# Patient Record
Sex: MALE | Race: WHITE | NOT HISPANIC OR LATINO | Employment: FULL TIME | URBAN - METROPOLITAN AREA
[De-identification: names, ages, dates, MRNs, and addresses within clinical notes are randomized per-mention and may not be internally consistent; named-entity substitution may affect disease eponyms.]

---

## 2017-05-26 ENCOUNTER — TELEPHONE (OUTPATIENT)
Dept: TRANSPLANT | Facility: CLINIC | Age: 27
End: 2017-05-26

## 2017-05-26 NOTE — TELEPHONE ENCOUNTER
"Living Liver Donor Evaluation Completed: May 25, 2017 16:37:48 CT Updated: May 25, 2017 16:38:21 CT  Donor Name: Fish Hampton MRN: Note: : 1990 Age: 26Gender: Male Donor Height: 6  4\" Weight (lb): 225 BMI: 27.4  Donor Race:  Ethnicity: Not / Donor Preferred Language: English  Required?: No Current Marital Status:   Demographics: Home Address: [address] [place] [address] City: [place] State: [place] Zip: [address] Country: [place]  Best Phone: [phone] Alt Phone: Donor Email: [email or web address] Best Phone Type: Mobile Alt Phone Type:   Preferred Contact Time(s): 11:00 AM-1:00 PM Preferred Contact Day(s): Mon, Tue, Wed, Thur, Fri  Donor Screen: PASSED Donor Referred by: Self Researched Donor self reported ABO: O  Recipient Information: Recipient Name (Last, First): Claudia Hampton Recipient :    1957  ... Donor Relationship: Parent Recipient Diagnosis: Recipient Height: Recipient Weight: Recipient ABO: Recipient MRN: Recipient Insurance Provider: Recipient Insurance Type:   MEDICAL HISTORY:  Herniated Disc (Lumbar)  Low Back Pain  MEDICATIONS:  Multivitamin  SURGICAL HISTORY:  None Reported  ALLERGIES:  NKDA  SOCIAL HISTORY:  EtOH: Occasional (1-2 drinks/week)  Illicit Drug Use: Denies  Tobacco: Denies  SELF-REPORTED FUNCTIONAL STATUS:  \"I am able to participate in strenuous sports such as swimming, singles tennis, football, basketball, or skiing\"  Exercise (>3X per week)  REVIEW OF ORGAN SYSTEMS: Airway or Lungs: No Blood Disorder: No Cancer: No Diabetes,Thyroid,Adrenal,Endocrine Disorder: No Digestive or Liver: No Heart or Circulatory System: No Immune Diseases: No Kidneys and Bladder: No Male Health: No Muscles,Bones,Joints: Yes Neuro: No Psych: No  FAMILY HISTORY: Confirmed:  Clots or Clotting Disease (Father)  Diabetes (Grandparent)  Inflammatory Bowel Disease (Father)  Liver Disease (Father)  Denied:  Cancer (denies)  Mental Illness (denies)  DONOR " INFORMATION:  Level of Education: College or baccalaureate degree complete Employment Status: Self Employed Medical Insurance Status: Has medical insurance Current Accommodation: Lives in rented accommodation Living Arrangement: With spouse Donor Motivation: Highly motivated donor  HIGH RISK BEHAVIOR:  Blood transfusion < 12 months. (NO)  Commercial sex < 12 months. (NO)  Illicit IV drug use < 5yrs. (NO)  Male:male sexual contact < 5yrs. (NO)  Other high risk sexual contact < 12 months. (NO)  EMERGENCY CONTACT INFORMATION:  Primary Secondary First Name: Judi Last Name: Berta Phone Number: [phone] Relationship: Mother  First Name: Jaclyn  Last Name: Berta Phone Number: [phone] Relationship: Spouse  REASON FOR DONATION:   I love my father and want him to live long enough to meet his grandchild  PHYSICIAN CONTACT INFORMATION:  PCP  Name: [facility]: [place] State: [place]  Phone: [phone]

## 2017-05-31 ENCOUNTER — TELEPHONE (OUTPATIENT)
Dept: TRANSPLANT | Facility: CLINIC | Age: 27
End: 2017-05-31

## 2017-05-31 NOTE — TELEPHONE ENCOUNTER
[facility]  Initial Telephone Psychosocial Screening  Living Organ Donation   Organ Type: related, liver  Presenting Information:  Mr. Fish Hampton presents to the [facility] since he is interested in becoming a potential liver donor to his biological father, Mr. Claudia Hampton.  I have been asked to complete an initial telephone psychosocial screening per our living liver donor evaluation protocol.  Mr. Hampton is a 26 year old , white, American ,male, who wants to donate a lobe of his liver to his father, Mr. Claudia Hampton.  PERSONAL BACKGROUND:  Current Living Situation: Mr. Hampton lives with his wife in a home that they rent in [place], [place].    Education/Employment/Financial Situation: Mr. Hampton completed a 4 year degree in [employer].  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  Mr. Hampton and his wife are covered by a [employer] plan through the [facility]'s Medical Assistance program.  Mr. Hampton's wife works outside the home as a .  Mr. Hampton and I discussed the loss of wages due to the fact that he would not be able to do his painting work for 8 weeks post surgery.  He states that his parents and his uncle have offered to make up any lost wages he might have as a result of living liver donation.  Mr. Hampton is also in the [employer].  He will be leaving on June 7, 2017 for a 3 week training in [place].  He will return on June 28, 2017.    Family History: Mr. Hampton was raised in [place] state.  He and his parents and 2 brothers moved here when his father was on sabbatical and never left.  His father is a .  His parents are still .  He has one older and one younger brother.  Mr. Hampton is  to his wife, Jaclyn Hampton, for 2 years.  They have a  baby on the way.  Jaclyn is due in December of 2017.    Mental Health: Denies any past or present treatment for mental health issues.  Denies any need to see a counselor or therapist.  Denies any past suicidal ideation, plans, or past attempts.  Denies any use of psychotropic medications.  Denies any past history of hospitalization for psychiatric illness.    Alcohol and Drug Use/Abuse/Dependency: Denies any past history of abuse or dependency on alcohol or illicit drugs. Denies any current use of street drugs, including marijuana.  Denies any past history of negative consequences of use of alcohol or drugs such as a DUI.   Mr. Hampton reports that he currently consumes 4 to 5 beers per week on average.  He states that he stopped drinking entirely 2 weeks ago in preparation to go through living liver donor testing.      Cigarette Use: Mr. Hampton does not smoke.    Legal: denies any legal issues.    Coping Strategies/Support System: His wife is supportive of his desire to be a donor.  He has a strong Anabaptism community and a lot of family support.  He is concerned about the risks of surgery given that he has a baby on the way who is due in December 2017.    DONOR SPECIFIC INFORMATION:  Relationship to Recipient: Mr. Hampton wants to donate a lobe of his liver to his father, Mr. Claudia Hampton, age 59.  They are biologically related.    Decision Process/Motivation to Donate: Mr. Hampton is motivated to help his father live longer and have a better quality of life.  He wants his father to be able to live to see his grandchild, who is due to be born in December of 2017.  He denies feeling any pressure, coercion, or being offered any payment to be a donor.    Impressions/Recommendations:   Mr. Fish Hampton  is highly motivated to donate a lobe of his liver to his father, Mr. Claudia Hampton, age 59.  His decision to donate is free of inducement, coercion, or other undue pressure.   His housing, finances and employment are stable.   No current/active mental health or chemical abuse issues were identified.  The need for a caregiver was reviewed and he is able to identify a plan to meet his post operative care needs.  Mr. Hampton appears capable of making an informed medical decision.  No psychosocial contraindications to living organ donation were identified and  I support Mr. Hampton s desire to continue the process of being evaluated as a potential living liver donor for his father, Mr. Claudia Hampton.     Contact Information:   FERN Dee, [facility]  Clinical  and Independent Donor Advocate  [facility] Trans[facility]  Pager:  [phone]  Direct:  [phone]      Time Spent: 45 minutes

## 2017-05-31 NOTE — TELEPHONE ENCOUNTER
I spoke to Fish to complete a living liver donor psychosocial telephone screening.  He asked if we could talk at 1 pm today.  I will call him then.    FERN Dee, [facility]  Clinical  and Independent Donor Advocate  [facility]  Pager:  [phone]  Direct:  [phone]

## 2017-06-01 ENCOUNTER — TELEPHONE (OUTPATIENT)
Dept: TRANSPLANT | Facility: CLINIC | Age: 27
End: 2017-06-01

## 2017-06-01 NOTE — TELEPHONE ENCOUNTER
Left message for Fish to return call. ? Do Phase 1 labs now? Leaving for [employer] trip 6/7 x 3 wks.

## 2017-06-01 NOTE — LETTER
PHYSICIAN ORDERS      DATE & TIME ISSUED: 2017 9:28 AM  PATIENT NAME: Fish Hampton   : 1990     [facility] MR#  [id number]     DIAGNOSIS:  Potential Living Donor  ICD-10 CODE: Z00.5     Please do the following lab tests:       1)Amylase       2)CMP       3)CBC with Platelets       4)INR/PTT       5)Lipid Panel    Any questions please call: Hannah at [phone]    Please fax these results to [phone]-ATTN:Hannah.    .

## 2017-06-01 NOTE — LETTER
REIMBURSEMENT INFORMATION FOR LIVING ORGAN DONORS    LIVING ORGAN DONOR: This form MUST accompany & remain attached to Orders &  given to Provider and/or Healthcare Facility Business Office    PROVIDER/FACILITY INSTRUCTIONS: By accepting to perform these services for living organ  donation, the provider/facility agrees to exclusively bill the [facility] instead of billing  the patient or any insurance provider and agrees to accept the reimbursement, as described below, as  payment in full for services rendered.    PROVIDER BILLING INSTRUCTIONS:  1. [facility] agrees to pay for all authorized testing ordered by our transplant  program that is related to living organ donation. The attached orders/tests are part of the donor  Evaluation.    2. Do not bill the donor or donor's insurance. Send an itemized invoice, claim or statement to:    [facility]  Transplant Finance/Donor Billing  [address] [place]  [place] [address]    3. Billing statements must include the patient first and last name, date of birth, the CPT procedure code  and date of service. Please bill service on the ORIGINAL UBO4 or 1500 with appropriate CPT/HCPCS  codes along with W-9 and send to the above address to insure timely reimbursement.    4. Claims should be submitted no later than six months from the date when services are rendered.  Claims denied for late submission should not be billed to the donor or their private insurance carrier.    5. [facility] will reimburse all charges at 100% of the Medicare Fee Schedule as  defined in the Code of Federal Regulations (CFR) 42, Chapter IV. This is to be considered payment  in full. [facility], the patient, and/or the patient's insurance are NOT to  be billed any balance, co-payment, or deductible, per Medicare regulations. **ATTN: Facility  providing services for attached/enclosed Living  Donor Orders; If facility does NOT AGREE to  the reimbursement rate stated above, PLEASE DENY SERVICES & refer Donor/patient back to  their [facility] Transplant Center.    6. Patients are NOT to make any payments at the time of service.    Please forward this information to your billing department so that a donor account can be set up with  these instructions.    Should you have any questions, please contact the Solid Organ Transplant office and ask for donor billing  at [phone] or [phone], Monday - Friday, 8:00 a.m. to 4:00 p.m.  Thank you for your assistance.

## 2017-06-01 NOTE — LETTER
PHYSICIAN ORDER   ABO BLOOD TYPE    DATE & TIME ISSUED: 20179:31 AM  PATIENT NAME: Fish Hampton   : 1990     [facility] MR# [if applicable]: [id number]     DIAGNOSIS:  Potential Living Donor  ICD-9 CODE: Z00.5    EXPIRES: (1 YEAR AFTER DATE ISSUED)    1. DRAW ABO BLOOD TYPE ONLY  (RH NOT REQUESTED AND NOT REIMBURSEABLE)    2. Mail or FAX results to:    THE TRANSPLANT CENTER  [facility]  [address] [place] [address]  FAX:  [phone]    3. Send billing to The Transplant Center at the above address.    .

## 2017-06-05 ENCOUNTER — OFFICE VISIT (OUTPATIENT)
Dept: URGENT CARE | Facility: URGENT CARE | Age: 27
End: 2017-06-05
Payer: MEDICAID

## 2017-06-05 ENCOUNTER — RADIANT APPOINTMENT (OUTPATIENT)
Dept: GENERAL RADIOLOGY | Facility: CLINIC | Age: 27
End: 2017-06-05
Attending: FAMILY MEDICINE
Payer: MEDICAID

## 2017-06-05 VITALS
WEIGHT: 230 LBS | BODY MASS INDEX: 28 KG/M2 | DIASTOLIC BLOOD PRESSURE: 80 MMHG | TEMPERATURE: 98.2 F | SYSTOLIC BLOOD PRESSURE: 128 MMHG | HEART RATE: 79 BPM | OXYGEN SATURATION: 99 %

## 2017-06-05 DIAGNOSIS — S99.922A FOOT INJURY, LEFT, INITIAL ENCOUNTER: Primary | ICD-10-CM

## 2017-06-05 PROCEDURE — 73630 X-RAY EXAM OF FOOT: CPT | Mod: LT

## 2017-06-05 PROCEDURE — 99213 OFFICE O/P EST LOW 20 MIN: CPT | Performed by: FAMILY MEDICINE

## 2017-06-05 RX ORDER — IBUPROFEN 200 MG
200 TABLET ORAL EVERY 4 HOURS PRN
COMMUNITY

## 2017-06-05 NOTE — MR AVS SNAPSHOT
"              After Visit Summary   2017    Fish Hampton    MRN: [id number]           Patient Information     Date Of Birth          1990        Visit Information        Provider Department      2017 9:20 AM Holden Chamorro, DO [facility]        Today's Diagnoses     Foot injury, left, initial encounter    -  1       Follow-ups after your visit        Who to contact     If you have questions or need follow up information about today's clinic visit or your schedule please contact [facility] directly at [phone].  Normal or non-critical lab and imaging results will be communicated to you by EarlyTrackshart, letter or phone within 4 business days after the clinic has received the results. If you do not hear from us within 7 days, please contact the clinic through EarlyTrackshart or phone. If you have a critical or abnormal lab result, we will notify you by phone as soon as possible.  Submit refill requests through Socratic or call your pharmacy and they will forward the refill request to us. Please allow 3 business days for your refill to be completed.          Additional Information About Your Visit        MyChart Information     Socratic lets you send messages to your doctor, view your test results, renew your prescriptions, schedule appointments and more. To sign up, go to [email or web address] . Click on \"Log in\" on the left side of the screen, which will take you to the Welcome page. Then click on \"Sign up Now\" on the right side of the page.     You will be asked to enter the access code listed below, as well as some personal information. Please follow the directions to create your username and password.     Your access code is: [email or web address]  Expires: 9/3/2017 10:21 AM     Your access code will  in 90 days. If you need help or a new code, please call your [facility] clinic or [phone].        Care EveryWhere ID     This is your Care EveryWhere " ID. This could be used by other organizations to access your [facility] medical records  [id number]        Your Vitals Were     Pulse Temperature Pulse Oximetry BMI (Body Mass Index)          79 98.2  F (36.8  C) 99% 28 kg/m2         Blood Pressure from Last 3 Encounters:   06/05/17 128/80   07/08/16 112/73   07/01/16 125/76    Weight from Last 3 Encounters:   06/05/17 230 lb (104.3 kg)   07/01/16 220 lb 11.2 oz (100.1 kg)   05/24/16 219 lb (99.3 kg)              We Performed the Following     XR Foot Left G/E 3 Views        Primary Care Provider Office Phone # Fax #    Ramirez Pierce [phone]       [place] [id number] [place], [place] [id number]  [facility] [address]        Thank you!     Thank you for choosing [facility]  for your care. Our goal is always to provide you with excellent care. Hearing back from our patients is one way we can continue to improve our services. Please take a few minutes to complete the written survey that you may receive in the mail after your visit with us. Thank you!             Your Updated Medication List - Protect others around you: Learn how to safely use, store and throw away your medicines at www.disposemymeds.org.          This list is accurate as of: 6/5/17 10:21 AM.  Always use your most recent med list.                   Brand Name Dispense Instructions for use    ibuprofen 200 MG tablet    ADVIL/MOTRIN     Take 200 mg by mouth every 4 hours as needed for mild pain

## 2017-06-05 NOTE — NURSING NOTE
"Chief Complaint   Patient presents with     Trauma     Pt injured his left foot and ankle on 5/31/17 while training with the .     Urgent Care       Initial /80  Pulse 79  Temp 98.2  F (36.8  C)  Wt 230 lb (104.3 kg)  SpO2 99%  BMI 28 kg/m2 Estimated body mass index is 28 kg/(m^2) as calculated from the following:    Height as of 7/1/16: 6' 4\" (1.93 m).    Weight as of this encounter: 230 lb (104.3 kg).  Medication Reconciliation: complete    "

## 2018-04-04 ENCOUNTER — TELEPHONE (OUTPATIENT)
Dept: TRANSPLANT | Facility: CLINIC | Age: 28
End: 2018-04-04

## 2018-04-04 NOTE — TELEPHONE ENCOUNTER
Fish did Candye questionaire again wanting to test for donation. Per recip coordinator, recip currently in hosp and we need to wait with further testing. No LM re: this and will watch and contact him when ready to cont process. Did testing and had SW screening last year.

## 2018-05-09 DIAGNOSIS — Z00.5 TRANSPLANT DONOR EVALUATION: Primary | ICD-10-CM

## 2018-05-22 DIAGNOSIS — Z00.5 TRANSPLANT DONOR EVALUATION: ICD-10-CM

## 2018-05-22 LAB
ABO + RH BLD: NORMAL
ABO + RH BLD: NORMAL
ALBUMIN SERPL-MCNC: 4.6 G/DL (ref 3.4–5)
ALP SERPL-CCNC: 60 U/L (ref 40–150)
ALT SERPL W P-5'-P-CCNC: 48 U/L (ref 0–70)
AMYLASE SERPL-CCNC: 45 U/L (ref 30–110)
ANION GAP SERPL CALCULATED.3IONS-SCNC: 8 MMOL/L (ref 3–14)
APTT PPP: 26 SEC (ref 22–37)
AST SERPL W P-5'-P-CCNC: 29 U/L (ref 0–45)
BILIRUB SERPL-MCNC: 0.9 MG/DL (ref 0.2–1.3)
BUN SERPL-MCNC: 18 MG/DL (ref 7–30)
CALCIUM SERPL-MCNC: 9 MG/DL (ref 8.5–10.1)
CHLORIDE SERPL-SCNC: 106 MMOL/L (ref 94–109)
CHOLEST SERPL-MCNC: 98 MG/DL
CO2 SERPL-SCNC: 26 MMOL/L (ref 20–32)
CREAT SERPL-MCNC: 0.84 MG/DL (ref 0.66–1.25)
ERYTHROCYTE [DISTWIDTH] IN BLOOD BY AUTOMATED COUNT: 12.3 % (ref 10–15)
GFR SERPL CREATININE-BSD FRML MDRD: >90 ML/MIN/1.7M2
GLUCOSE SERPL-MCNC: 89 MG/DL (ref 70–99)
HCT VFR BLD AUTO: 44.1 % (ref 40–53)
HDLC SERPL-MCNC: 36 MG/DL
HGB BLD-MCNC: 15.9 G/DL (ref 13.3–17.7)
INR PPP: 0.95 (ref 0.86–1.14)
LDLC SERPL CALC-MCNC: 55 MG/DL
MCH RBC QN AUTO: 28.8 PG (ref 26.5–33)
MCHC RBC AUTO-ENTMCNC: 36.1 G/DL (ref 31.5–36.5)
MCV RBC AUTO: 80 FL (ref 78–100)
NONHDLC SERPL-MCNC: 62 MG/DL
PLATELET # BLD AUTO: 160 10E9/L (ref 150–450)
POTASSIUM SERPL-SCNC: 4.3 MMOL/L (ref 3.4–5.3)
PROT SERPL-MCNC: 8 G/DL (ref 6.8–8.8)
RBC # BLD AUTO: 5.52 10E12/L (ref 4.4–5.9)
SODIUM SERPL-SCNC: 140 MMOL/L (ref 133–144)
SPECIMEN EXP DATE BLD: NORMAL
TRIGL SERPL-MCNC: 35 MG/DL
WBC # BLD AUTO: 4.3 10E9/L (ref 4–11)

## 2018-05-22 PROCEDURE — 80053 COMPREHEN METABOLIC PANEL: CPT | Performed by: TRANSPLANT SURGERY

## 2018-05-22 PROCEDURE — 82150 ASSAY OF AMYLASE: CPT | Performed by: TRANSPLANT SURGERY

## 2018-05-22 PROCEDURE — 80061 LIPID PANEL: CPT | Performed by: TRANSPLANT SURGERY

## 2018-05-22 PROCEDURE — 85730 THROMBOPLASTIN TIME PARTIAL: CPT | Performed by: TRANSPLANT SURGERY

## 2018-05-22 PROCEDURE — 85027 COMPLETE CBC AUTOMATED: CPT | Performed by: TRANSPLANT SURGERY

## 2018-05-22 PROCEDURE — 86900 BLOOD TYPING SEROLOGIC ABO: CPT | Performed by: TRANSPLANT SURGERY

## 2018-05-22 PROCEDURE — 85610 PROTHROMBIN TIME: CPT | Performed by: TRANSPLANT SURGERY

## 2018-05-22 PROCEDURE — 36415 COLL VENOUS BLD VENIPUNCTURE: CPT | Performed by: TRANSPLANT SURGERY

## 2018-05-24 ENCOUNTER — TELEPHONE (OUTPATIENT)
Dept: TRANSPLANT | Facility: CLINIC | Age: 28
End: 2018-05-24

## 2018-05-24 DIAGNOSIS — Z00.5 TRANSPLANT DONOR EVALUATION: ICD-10-CM

## 2018-05-24 NOTE — TELEPHONE ENCOUNTER
All Phase 1 tests are OK. Was screened by ANDREAS a few months ago and was OK. Wants to come for eval.

## 2018-06-14 ENCOUNTER — ALLIED HEALTH/NURSE VISIT (OUTPATIENT)
Dept: TRANSPLANT | Facility: CLINIC | Age: 28
End: 2018-06-14
Attending: TRANSPLANT SURGERY

## 2018-06-14 ENCOUNTER — OFFICE VISIT (OUTPATIENT)
Dept: TRANSPLANT | Facility: CLINIC | Age: 28
End: 2018-06-14
Attending: TRANSPLANT SURGERY
Payer: COMMERCIAL

## 2018-06-14 ENCOUNTER — OFFICE VISIT (OUTPATIENT)
Dept: TRANSPLANT | Facility: CLINIC | Age: 28
End: 2018-06-14
Attending: INTERNAL MEDICINE

## 2018-06-14 ENCOUNTER — OFFICE VISIT (OUTPATIENT)
Dept: TRANSPLANT | Facility: CLINIC | Age: 28
End: 2018-06-14
Attending: NURSE PRACTITIONER
Payer: COMMERCIAL

## 2018-06-14 VITALS
HEART RATE: 68 BPM | OXYGEN SATURATION: 99 % | WEIGHT: 240 LBS | BODY MASS INDEX: 29.22 KG/M2 | TEMPERATURE: 98.2 F | DIASTOLIC BLOOD PRESSURE: 82 MMHG | SYSTOLIC BLOOD PRESSURE: 133 MMHG | RESPIRATION RATE: 18 BRPM | HEIGHT: 76 IN

## 2018-06-14 VITALS
WEIGHT: 240 LBS | RESPIRATION RATE: 18 BRPM | HEART RATE: 52 BPM | DIASTOLIC BLOOD PRESSURE: 80 MMHG | TEMPERATURE: 98.2 F | HEIGHT: 76 IN | SYSTOLIC BLOOD PRESSURE: 125 MMHG | BODY MASS INDEX: 29.22 KG/M2 | OXYGEN SATURATION: 99 %

## 2018-06-14 VITALS
RESPIRATION RATE: 18 BRPM | HEART RATE: 52 BPM | TEMPERATURE: 98.2 F | DIASTOLIC BLOOD PRESSURE: 80 MMHG | BODY MASS INDEX: 29.22 KG/M2 | HEIGHT: 76 IN | WEIGHT: 240 LBS | OXYGEN SATURATION: 99 % | SYSTOLIC BLOOD PRESSURE: 125 MMHG

## 2018-06-14 DIAGNOSIS — Z00.5 TRANSPLANT DONOR EVALUATION: Primary | ICD-10-CM

## 2018-06-14 DIAGNOSIS — Z52.6 LIVER DONOR: Primary | ICD-10-CM

## 2018-06-14 DIAGNOSIS — Z00.5 TRANSPLANT DONOR EVALUATION: ICD-10-CM

## 2018-06-14 DIAGNOSIS — Z52.6 LIVER DONOR: ICD-10-CM

## 2018-06-14 LAB
ABO + RH BLD: NORMAL
ABO + RH BLD: NORMAL
ALBUMIN SERPL-MCNC: 4.4 G/DL (ref 3.4–5)
ALBUMIN UR-MCNC: NEGATIVE MG/DL
ALP SERPL-CCNC: 62 U/L (ref 40–150)
ALT SERPL W P-5'-P-CCNC: 38 U/L (ref 0–70)
AMORPH CRY #/AREA URNS HPF: ABNORMAL /HPF
ANION GAP SERPL CALCULATED.3IONS-SCNC: 7 MMOL/L (ref 3–14)
APPEARANCE UR: CLEAR
APTT PPP: 30 SEC (ref 22–37)
AST SERPL W P-5'-P-CCNC: 19 U/L (ref 0–45)
BILIRUB SERPL-MCNC: 0.9 MG/DL (ref 0.2–1.3)
BILIRUB UR QL STRIP: NEGATIVE
BLD GP AB SCN SERPL QL: NORMAL
BLOOD BANK CMNT PATIENT-IMP: NORMAL
BUN SERPL-MCNC: 16 MG/DL (ref 7–30)
CALCIUM SERPL-MCNC: 8.7 MG/DL (ref 8.5–10.1)
CERULOPLASMIN SERPL-MCNC: 27 MG/DL (ref 23–53)
CHLORIDE SERPL-SCNC: 107 MMOL/L (ref 94–109)
CHOLEST SERPL-MCNC: 87 MG/DL
CMV IGG SERPL QL IA: 0.5 AI (ref 0–0.8)
CO2 SERPL-SCNC: 27 MMOL/L (ref 20–32)
COLOR UR AUTO: YELLOW
CREAT SERPL-MCNC: 0.82 MG/DL (ref 0.66–1.25)
EBV VCA IGG SER QL IA: 1.9 AI (ref 0–0.8)
EBV VCA IGM SER QL IA: 0.2 AI (ref 0–0.8)
ERYTHROCYTE [DISTWIDTH] IN BLOOD BY AUTOMATED COUNT: 11.9 % (ref 10–15)
FERRITIN SERPL-MCNC: 152 NG/ML (ref 26–388)
GFR SERPL CREATININE-BSD FRML MDRD: >90 ML/MIN/1.7M2
GGT SERPL-CCNC: 12 U/L (ref 0–75)
GLUCOSE SERPL-MCNC: 95 MG/DL (ref 70–99)
GLUCOSE UR STRIP-MCNC: NEGATIVE MG/DL
HBV CORE AB SERPL QL IA: NONREACTIVE
HBV SURFACE AB SERPL IA-ACNC: 29.75 M[IU]/ML
HBV SURFACE AG SERPL QL IA: NONREACTIVE
HCT VFR BLD AUTO: 45.7 % (ref 40–53)
HCV AB SERPL QL IA: NONREACTIVE
HDLC SERPL-MCNC: 34 MG/DL
HGB BLD-MCNC: 16.4 G/DL (ref 13.3–17.7)
HGB UR QL STRIP: NEGATIVE
HIV 1+2 AB+HIV1 P24 AG SERPL QL IA: NONREACTIVE
INR PPP: 1 (ref 0.86–1.14)
INTERPRETATION ECG - MUSE: NORMAL
IRON SATN MFR SERPL: 26 % (ref 15–46)
IRON SERPL-MCNC: 78 UG/DL (ref 35–180)
KETONES UR STRIP-MCNC: NEGATIVE MG/DL
LDLC SERPL CALC-MCNC: 46 MG/DL
LEUKOCYTE ESTERASE UR QL STRIP: NEGATIVE
LIPASE SERPL-CCNC: 68 U/L (ref 73–393)
MCH RBC QN AUTO: 28.5 PG (ref 26.5–33)
MCHC RBC AUTO-ENTMCNC: 35.9 G/DL (ref 31.5–36.5)
MCV RBC AUTO: 79 FL (ref 78–100)
MUCOUS THREADS #/AREA URNS LPF: PRESENT /LPF
NITRATE UR QL: NEGATIVE
NONHDLC SERPL-MCNC: 53 MG/DL
PH UR STRIP: 6 PH (ref 5–7)
PHOSPHATE SERPL-MCNC: 3.6 MG/DL (ref 2.5–4.5)
PLATELET # BLD AUTO: 171 10E9/L (ref 150–450)
POTASSIUM SERPL-SCNC: 4 MMOL/L (ref 3.4–5.3)
PROT SERPL-MCNC: 7.8 G/DL (ref 6.8–8.8)
RBC # BLD AUTO: 5.76 10E12/L (ref 4.4–5.9)
RBC #/AREA URNS AUTO: 1 /HPF (ref 0–2)
SODIUM SERPL-SCNC: 140 MMOL/L (ref 133–144)
SOURCE: ABNORMAL
SP GR UR STRIP: 1.02 (ref 1–1.03)
SPECIMEN EXP DATE BLD: NORMAL
T PALLIDUM AB SER QL: NONREACTIVE
TIBC SERPL-MCNC: 299 UG/DL (ref 240–430)
TRIGL SERPL-MCNC: 35 MG/DL
UROBILINOGEN UR STRIP-MCNC: 0 MG/DL (ref 0–2)
WBC # BLD AUTO: 4 10E9/L (ref 4–11)
WBC #/AREA URNS AUTO: <1 /HPF (ref 0–5)

## 2018-06-14 PROCEDURE — 36415 COLL VENOUS BLD VENIPUNCTURE: CPT | Performed by: TRANSPLANT SURGERY

## 2018-06-14 PROCEDURE — 86665 EPSTEIN-BARR CAPSID VCA: CPT | Performed by: TRANSPLANT SURGERY

## 2018-06-14 PROCEDURE — 87340 HEPATITIS B SURFACE AG IA: CPT | Performed by: TRANSPLANT SURGERY

## 2018-06-14 PROCEDURE — 86480 TB TEST CELL IMMUN MEASURE: CPT | Performed by: TRANSPLANT SURGERY

## 2018-06-14 PROCEDURE — 84100 ASSAY OF PHOSPHORUS: CPT | Performed by: TRANSPLANT SURGERY

## 2018-06-14 PROCEDURE — 80061 LIPID PANEL: CPT | Performed by: TRANSPLANT SURGERY

## 2018-06-14 PROCEDURE — 86850 RBC ANTIBODY SCREEN: CPT | Performed by: TRANSPLANT SURGERY

## 2018-06-14 PROCEDURE — 85027 COMPLETE CBC AUTOMATED: CPT | Performed by: TRANSPLANT SURGERY

## 2018-06-14 PROCEDURE — 81240 F2 GENE: CPT | Performed by: TRANSPLANT SURGERY

## 2018-06-14 PROCEDURE — G0463 HOSPITAL OUTPT CLINIC VISIT: HCPCS | Mod: ZF

## 2018-06-14 PROCEDURE — 40000866 ZZHCL STATISTIC HIV 1/2 ANTIGEN/ANTIBODY PRETRANSPLANT ONLY: Performed by: TRANSPLANT SURGERY

## 2018-06-14 PROCEDURE — 83550 IRON BINDING TEST: CPT | Performed by: TRANSPLANT SURGERY

## 2018-06-14 PROCEDURE — 86038 ANTINUCLEAR ANTIBODIES: CPT | Performed by: TRANSPLANT SURGERY

## 2018-06-14 PROCEDURE — 85610 PROTHROMBIN TIME: CPT | Performed by: TRANSPLANT SURGERY

## 2018-06-14 PROCEDURE — 82103 ALPHA-1-ANTITRYPSIN TOTAL: CPT | Performed by: TRANSPLANT SURGERY

## 2018-06-14 PROCEDURE — 86704 HEP B CORE ANTIBODY TOTAL: CPT | Performed by: TRANSPLANT SURGERY

## 2018-06-14 PROCEDURE — 83540 ASSAY OF IRON: CPT | Performed by: TRANSPLANT SURGERY

## 2018-06-14 PROCEDURE — 85730 THROMBOPLASTIN TIME PARTIAL: CPT | Performed by: TRANSPLANT SURGERY

## 2018-06-14 PROCEDURE — 86900 BLOOD TYPING SEROLOGIC ABO: CPT | Performed by: TRANSPLANT SURGERY

## 2018-06-14 PROCEDURE — 81001 URINALYSIS AUTO W/SCOPE: CPT | Performed by: TRANSPLANT SURGERY

## 2018-06-14 PROCEDURE — 83690 ASSAY OF LIPASE: CPT | Performed by: TRANSPLANT SURGERY

## 2018-06-14 PROCEDURE — 86901 BLOOD TYPING SEROLOGIC RH(D): CPT | Performed by: TRANSPLANT SURGERY

## 2018-06-14 PROCEDURE — 81332 SERPINA1 GENE: CPT | Performed by: TRANSPLANT SURGERY

## 2018-06-14 PROCEDURE — 86780 TREPONEMA PALLIDUM: CPT | Performed by: TRANSPLANT SURGERY

## 2018-06-14 PROCEDURE — 83516 IMMUNOASSAY NONANTIBODY: CPT | Performed by: TRANSPLANT SURGERY

## 2018-06-14 PROCEDURE — 82728 ASSAY OF FERRITIN: CPT | Performed by: TRANSPLANT SURGERY

## 2018-06-14 PROCEDURE — 82390 ASSAY OF CERULOPLASMIN: CPT | Performed by: TRANSPLANT SURGERY

## 2018-06-14 PROCEDURE — 81241 F5 GENE: CPT | Performed by: TRANSPLANT SURGERY

## 2018-06-14 PROCEDURE — 86803 HEPATITIS C AB TEST: CPT | Performed by: TRANSPLANT SURGERY

## 2018-06-14 PROCEDURE — 86644 CMV ANTIBODY: CPT | Performed by: TRANSPLANT SURGERY

## 2018-06-14 PROCEDURE — 82977 ASSAY OF GGT: CPT | Performed by: TRANSPLANT SURGERY

## 2018-06-14 PROCEDURE — 80053 COMPREHEN METABOLIC PANEL: CPT | Performed by: TRANSPLANT SURGERY

## 2018-06-14 PROCEDURE — 86706 HEP B SURFACE ANTIBODY: CPT | Performed by: TRANSPLANT SURGERY

## 2018-06-14 ASSESSMENT — PAIN SCALES - GENERAL
PAINLEVEL: NO PAIN (0)

## 2018-06-14 NOTE — LETTER
6/14/2018       RE: Fish Hampton  [address]  [place] [address]     Dear Colleague,    Thank you for referring your patient, Fish Hampton, to the [facility] SOLID ORGAN TRANSPLANT at [facility]. Please see a copy of my visit note below.    HPI      ROS      Physical Exam    See sc note    Again, thank you for allowing me to participate in the care of your patient.      Sincerely,    Jesu Lagunas MD

## 2018-06-14 NOTE — MR AVS SNAPSHOT
After Visit Summary   6/14/2018    Fish Hampton    MRN: [id number]           Patient Information     Date Of Birth          1990        Visit Information        Provider Department      6/14/2018 8:00 AM Carmen Fuentes [facility] Solid Organ Transplant        Today's Diagnoses     Transplant donor evaluation    -  1       Follow-ups after your visit        Your next 10 appointments already scheduled     Tyson 15, 2018 12:00 PM CDT   CTA ANGIOGRAM ABDOMEN PELVIS with UUCT1   [facility], [place], [place] ([facility], [facility])    [address] [address] [phone]           Please bring any scans or X-rays taken at other hospitals, if similar tests were done. Also bring a list of your medicines, including vitamins, minerals and over-the-counter drugs. It is safest to leave personal items at home.  Be sure to tell your doctor:   If you have any allergies.   If there s any chance you are pregnant.   If you are breastfeeding.    If you have diabetes as your medication may need to be adjusted for this exam.  You will have contrast for this exam. To prepare:   Do not eat or drink for 2 hours before your exam. If you need to take medicine, you may take it with small sips of water. (We may ask you to take liquid medicine as well.)   The day before your exam, drink extra fluids at least six 8-ounce glasses (unless your doctor tells you to restrict your fluids).  Patients over 70 or patients with diabetes or kidney problems:   If you haven t had a blood test (creatinine test) within the last 30 days, the Cardiologist/Radiologist may require you to get this test prior to your exam.  Please wear loose clothing, such as a sweat suit or jogging clothes. Avoid snaps, zippers and other metal. We may ask you to undress and put on a hospital gown.  If you have any questions, please call the Imaging Department where you will have your exam.             Tyson 15, 2018  1:00 PM CDT   MR ABDOMEN MRCP W/O & W CONTRAST with UUMR1   [facility], [place], [facility] ([facility] [place])    [address] [address] [phone]           Take your medicines as usual, unless your doctor tells you not to. Bring a list of your current medicines to your exam (including vitamins, minerals and over-the-counter drugs). Also bring the results of similar scans you may have had.    You may or may not receive IV contrast for this exam pending the discretion of the Radiologist.   Do not eat or drink for 6 hours prior to exam.  The MRI machine uses a strong magnet. Please wear clothes without metal (snaps, zippers). A sweatsuit works well, or we may give you a hospital gown.  Please remove any body piercings and hair extensions before you arrive. You will also remove watches, jewelry, hairpins, wallets, dentures, partial dental plates and hearing aids. You may wear contact lenses, and you may be able to wear your rings. We have a safe place to keep your personal items, but it is safer to leave them at home.  **IMPORTANT** THE INSTRUCTIONS BELOW ARE ONLY FOR THOSE PATIENTS WHO HAVE BEEN PRESCRIBED SEDATION OR GENERAL ANESTHESIA DURING THEIR MRI PROCEDURE:  IF YOUR DOCTOR PRESCRIBED ORAL SEDATION (take medicine to help you relax during your exam):   You must get the medicine from your doctor (oral medication) before you arrive. Bring the medicine to the exam. Do not take it at home. You ll be told when to take it upon arriving for your exam.   Arrive one hour early. Bring someone who can take you home after the test. Your medicine will make you sleepy. After the exam, you may not drive, take a bus or take a taxi by yourself.  IF YOUR DOCTOR PRESCRIBED IV SEDATION:   Arrive one hour early. Bring someone who can take you home after the test. Your medicine will make you sleepy. After the exam, you may not drive, take a bus or take a  "taxi by yourself.   No eating 6 hours before your exam. You may have clear liquids up until 4 hours before your exam. (Clear liquids include water, clear tea, black coffee and fruit juice without pulp.)  IF YOUR DOCTOR PRESCRIBED ANESTHESIA (be asleep for your exam):   Arrive 1 1/2 hours early. Bring someone who can take you home after the test. You may not drive, take a bus or take a taxi by yourself.   No eating 8 hours before your exam. You may have clear liquids up until 4 hours before your exam. (Clear liquids include water, clear tea, black coffee and fruit juice without pulp.)   You will spend four to five hours in the recovery room.  If you have any questions, please contact your Imaging Department exam site.              Future tests that were ordered for you today     Open Future Orders        Priority Expected Expires Ordered    Phosphorus Routine 6/14/2018 7/14/2018 6/14/2018            Who to contact     If you have questions or need follow up information about today's clinic visit or your schedule please contact [facility] directly at [phone].  Normal or non-critical lab and imaging results will be communicated to you by Screenherohart, letter or phone within 4 business days after the clinic has received the results. If you do not hear from us within 7 days, please contact the clinic through For Your Imaginationt or phone. If you have a critical or abnormal lab result, we will notify you by phone as soon as possible.  Submit refill requests through [facility] At [facility] or call your pharmacy and they will forward the refill request to us. Please allow 3 business days for your refill to be completed.          Additional Information About Your Visit        All At Home Information     [facility] lets you send messages to your doctor, view your test results, renew your prescriptions, schedule appointments and more. To sign up, go to [email or web address] . Click on \"Log in\" on the left side of the screen, which will " "take you to the Welcome page. Then click on \"Sign up Now\" on the right side of the page.     You will be asked to enter the access code listed below, as well as some personal information. Please follow the directions to create your username and password.     Your access code is: [email or web address]  Expires: 2018  6:30 AM     Your access code will  in 90 days. If you need help or a new code, please call your [place] clinic or [phone].        Care EveryWhere ID     This is your Care EveryWhere ID. This could be used by other organizations to access your [place] medical records  [id number]         Blood Pressure from Last 3 Encounters:   18 125/80   18 133/82   18 125/80    Weight from Last 3 Encounters:   18 108.9 kg (240 lb)   18 108.9 kg (240 lb)   18 108.9 kg (240 lb)              Today, you had the following     No orders found for display       Primary Care Provider Office Phone # Fax #    Ramirez Ryan Pierce [phone]       [address] [place] [address]        Equal Access to Services     SADE BHAKTA : Hadii kalyan nielson hadasho Soalexiali, waaxda luqadaha, qaybta kaalmada adeegyada, lily landa. So [facility] [phone].    ATENCIÓN: Si habla español, tiene a catalan disposición servicios gratuitos de asistencia lingüística. Llame al [phone].    We comply with applicable federal civil rights laws and [place] laws. We do not discriminate on the basis of race, color, national origin, age, disability, sex, sexual orientation, or gender identity.            Thank you!     Thank you for choosing [facility]  for your care. Our goal is always to provide you with excellent care. Hearing back from our patients is one way we can continue to improve our services. Please take a few minutes to complete the written survey that you may receive in the mail after your visit with us. Thank you!           "   Your Updated Medication List - Protect others around you: Learn how to safely use, store and throw away your medicines at www.disposemymeds.org.          This list is accurate as of 6/14/18 11:59 PM.  Always use your most recent med list.                   Brand Name Dispense Instructions for use Diagnosis    ibuprofen 200 MG tablet    ADVIL/MOTRIN     Take 200 mg by mouth every 4 hours as needed for mild pain

## 2018-06-14 NOTE — PROGRESS NOTES
SUBJECTIVE:  Fish Hampton is a 27 year old male presents for living liver donor evaluation.   Patient reports that 2 years ago he was evaluated for low back pain. He was seen by Sports Medicine and had MRI of the low back. He reports that he occasionally has exacerbations of the low back pain, most recent one was over the weekend. Patient also reports that he was diagnosed with a stress fracture which likely occurred in April. He also started a new job that required him to be on his feet. He will need to wear a boot, will have follow up in 6 weeks. He states that he has been taking ibuprofen as needed for back pain.     Past Medical History:   Diagnosis Date     Reduced vision      Scoliosis      There is no personal history of significant medical conditions which include but are not limited to hypertension, diabetes, renal diseases, lung disease, heart disease, gastrointestinal disease, autoimmune disease, neurologic disease, genitourinary disease, hematologic disorders, bleeding or clotting disorders, history of cancer, history of infections or obesity. No history of sychiatric illness, depression, suicide attempts .    Liver Specific History  No personal history of abnormal liver function tests, diabetes, fatty liver disease, jaundice, bleeding or unexplained pruritis. No history of DVT or pulmonary emboli to suggest an occult hypercoagulable state. No active and past hepatotoxic medications or chronic use of pain medications.         Family History   Problem Relation Age of Onset     DIABETES Maternal Grandfather      There is no reported family history of early coronary artery disease, specific cancers, bleeding and/or clotting disorders. No family history of liver disease, autoimmune disease, diabetes or viral hepatitis.    Social History     Social History     Marital status: Single     Spouse name: N/A     Number of children: N/A     Years of education: N/A     Occupational History     Not on file.      Social History Main Topics     Smoking status: Never Smoker     Smokeless tobacco: Never Used     Alcohol use Yes      Comment: Occasionally     Drug use: No     Sexual activity: Yes     Partners: Female     Birth control/ protection: Pull-out method, Condom     Other Topics Concern     Not on file     Social History Narrative     1. Occupation, employment status, health insurance status, living arrangements, and social support   2. Smoking, alcohol and drug use and/or abuse   3. Psychiatric illness, depression, suicide attempts     Behavior/History Exclusionary Criteria ( Public Health Service High Risk Criteria).    The patient reports no history of the followin. Men who have had sex with another man in the preceding 5 years.  2. Persons who report nonmedical intravenous, intramuscular, or subcutaneous injection of drugs in the preceding 5 years.  3. Persons with hemophilia or related clotting disorders who have received human-derived clotting factor concentrates.  4. Men and women who have engaged in sex in exchange for money or drugs in the preceding 5 years.  5. Persons who have had sex in the preceding 12 months with any person described in items 1-4 above or with a person known or suspected to have HIV infection.  6. Persons who have been exposed in the preceding 12 months to known or suspected HIV-infected blood through percutaneous inoculation or through contact with an open wound, nonintact skin, or mucous membrane..  7. Inmates of correctional systems .          Review Of Systems    Constitutional: no fevers, chills, night sweats or unintentional weight change   Eyes: no vision change, diplopia or red eyes   Ears, Nose, Mouth, Throat: no tinnitus or hearing change, no epistaxis or nasal discharge, no oral lesions, throat clear   Cardiovascular: no chest pain, palpitations, or pain with walking, no orthopnea or PND   Respiratory: no dyspnea, cough, shortness of breath or wheezing   GI: no  "nausea, vomiting, diarrhea or constipation, no abdominal pain   : no change in urine, no dysuria or hematuria, no sexual dysfunction   Musculoskeletal: right foot pain  Integumentary: no concerning lesions or moles   Neuro: no loss of strength or sensation, no numbness or tingling, no tremor, no dizziness, no headache   Endo: no polyuria or polydipsia, no temperature intolerance   Heme/Lymph: no concerning bumps, no bleeding problems   Allergy: no environmental allergies   Psych: no depression or anxiety, no sleep problems    Current Outpatient Prescriptions   Medication     ibuprofen (ADVIL/MOTRIN) 200 MG tablet     No current facility-administered medications for this visit.        PHYSICAL EXAM:  /82 (BP Location: Right arm, Patient Position: Chair, Cuff Size: Adult Regular)  Pulse 68  Temp 98.2  F (36.8  C) (Oral)  Resp 18  Ht 1.93 m (6' 3.98\")  Wt 108.9 kg (240 lb)  SpO2 99%  BMI 29.23 kg/m2      Constitutional: no distress, comfortable, pleasant   Eyes: anicteric, normal extra-ocular movements   Ears, Nose and Throat: tympanic membranes clear, nose clear and free of lesions, throat clear, neck supple with full range of motion, no thyromegaly.   Cardiovascular: regular rate and rhythm, normal S1 and S2, no murmurs, rubs or gallops, peripheral pulses full and symmetric   Respiratory: clear to auscultation, no wheezes or crackles, normal breath sounds   Gastrointestinal: positive bowel sounds, nontender, no hepatosplenomegaly, no masses   Musculoskeletal: full range of motion, no edema   Skin: no concerning lesions, no jaundice. No spider angiomata, caput medusae, or other skin stigmata of liver disease.  Neurological: cranial nerves intact, normal strength and sensation, reflexes at patella and biceps normal, normal gait, no tremor   Psychological: appropriate mood   Lymphatic: no cervical, axillary or inguinal lymphadenopathy      ASSESSMENT/PLAN:  Patient with recent stress fracture, being " considered for live liver donation.  Recommendations.    1.  Recent stress fracture.  Recommend follow-up with primary care provider to exclude possible contributing factors such as vitamin D deficiency.  Patient was advised that liver donation should be postponed until the fracture is healed.  Follow-up with podiatry is currently planned by the patient.  2.  Other health concerns.  Patient has no other medical issues that would preclude him from being a liver donor.    REVIEW OF EXCLUSION CRITERIA     1. Fish Hampton is over age 18 years and is mentally capable of making an informed decision  2. HIV result is negative  3. HCV RNA test result is negative.  4. HBsAg test result is negative.  5. Donors with ZZ, Z-null, null-null and S-null alpha-1-antitrypsin phenotypes and untype-able phenotypes: Per transplant team.  6. There is no evidence of active malignancy, or incompletely treated malignancy  7.There is no suspicion of donor coercion  8. There is no suspicion of illegal financial exchange between donor and recipient.  9. There is no evidence of acute symptomatic infection  10. There is no evidence of uncontrolled diagnosable psychiatric conditions requiring treatment before donation, including any evidence of current suicidality  11. There is no evidence of untreated and active substance abuse  12. Donor remnant volume less than 30% of native liver volume: Per transplant team.    Review of Relative Contraindications     1. There is no evidence of diabetes or impaired fasting glucose with other features of the metabolic syndrome (low HDL and high triglycerides)  2. There is no evidence of unexplained liver function test (LFT) abnormalities  3. Vascular or biliary anatomy in the donor liver that makes the likelihood of successful transplantation low or increases the risk in the potential donor: Per transplant team.  4. There is no evidence of multiple or complex upper abdominal surgeries   5. Significant hepatic  steatosis: Per transplant team  6. There is no evidence of chronic kidney disease.  7. There is no evidence of a significant history of thrombosis or embolism.  8. There is no evidence of bleeding disorders.  9. His BMI is less than 35 Kg/m2.  10. His age is less than 60 years.  11. There is no evidence of clinically significant cardiovascular disease.  12. There is no evidence of clinically significant pulmonary disease.  13. There is no evidence of history of cancer.  14. There is no evidence of prior history of substance abuse.  15. There is sufficient family, caregiver, social, and/or economic support.  16. There is no evidence of trained donor/recipient relationship.  17. Does the patient have health insurance  18. The patient does not smoke.  19. Active oral contraception. (not applicable)        Recommended Testing (see labs already completed in EHR):    General Laboratory Tests:   1. Complete blood count (CBC)   2. Blood type and screen   3. Partial thromboplastin time (PTT)   4. International Normalized Ratio (INR) or Prothrombin time (PT)   5. Metabolic testing (electrolytes, BUN, creatinine, albumin, calcium, phosphorus)   6. Pregnancy test for premenopausal women without surgical sterilization   7. Chest X-Ray   8. Electrocardiogram (ECG)   9. Urinalysis   10. Hypercoagulable state evaluation: lupus anticoagulant, [id number], PT Gene Mutation  11. 10 year CV Risk calculation  12. PFTs for smokers    Other Metabolic Testin. Fasting blood glucose   2. Fasting lipid profile (cholesterol, triglycerides, HDL-cholesterol, and LDL-cholesterol)   3. Glycosylated hemoglobin in first degree relatives of diabetics and in high risk individuals such as those with metabolic syndrome     Liver-Specific Tests:   1. Hepatic function panel   2. Ceruloplasmin in a donor with family history of Landry s Disease   3. Iron, iron binding capacity, ferritin   4. Alpha-1-antitrypsin level: those with low alpha-1-antitrypsin  levels should have a phenotype.   5. JOSH, Anti-Smooth Muscle Abs  Infectious disease testing must include:   1. CMV (cytomegalovirus) Antibody   2. EBV (Kristal Barr virus) Antibody   3. HIV 1,2 (human immunodeficiency virus) antibody testing   4. HepBsAg (hepatitis B surface antigen)   5. HepBcAb (hepatitis B core antibody) *   6. HepBsAb (hepatitis B surface antibody)   7. HCV (hepatitis C virus) antibody testing   8. RPR (Rapid Plasma Reagin test for syphilis)   * HBV DNA must be tested if HBcAB is positive   9)  PPD or Quantiferon Gold  10)  Testing is considered but the patient is not from an endemic area for  Strongyloides or Trypanosoma cruzi and has no symptoms to suggest West Nile virus infection.    Age and Risk-factor Specific Cancer Screenin. Cervical Cancer   2. Breast Cancer   3. Prostate Cancer   4. Colon Cancer   5. Skin Cancer   6. Lung cancer     Danni Coulter  Internal Medicine and Pediatrics  [facility]   pager [phone]

## 2018-06-14 NOTE — PROGRESS NOTES
Psychosocial Evaluation  Living Organ Donation per OPTN Policy 14.1.A  Organ Type: related, liver  Presenting Information:  Mr. Fish Hampton presents to the [facility] since he is interested in becoming a potential liver donor to his biological father, Mr. Claudia Hampton, age 60.  I have been asked to complete a psychosocial assessment.  Mr. Hampton is a 27 year old , white, American ,male, who wants to donate a lobe of his liver to his father, Mr. Claudia Hampton, age 60.  Mr. Hampton is a [place] Citizen.  He is casually dressed in shorts and a white T-Shirt.  His mood is euthymic.  Affect is congruent with mood.  He is a very pleasant, friendly, gentleman who is very forth-coming with information for this evaluation.    PERSONAL BACKGROUND:  Current Living Situation: Mr. Hampton lives with his wife and their 5 month old baby daughter in a home that they own in [place], [place].  This is a 30 minute drive to the [facility].     Education/Employment/Financial Situation: Mr. Hampton completed a 4 year degree in [employer].  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  He plays guitar and sings.  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  He is a full time  for this company.  Since he just started this job in April of 2018, he does not have paid time off.  He will have 10 days of paid time off in July 2018.  He is not sure if he has short term disability insurance, but will check with his company about this.  Mr. Hampton and his wife are covered by a [employer] plan, [employer], through the [facility]'s Medical Assistance program.  Mr. Hampton's wife works outside the home as a .  Mr. Hampton and I  discussed the loss of wages due to the fact that he would not be able to do his painting work for 8 weeks post surgery.  He states that his parents and his uncle have offered to make up any lost wages he might have as a result of living liver donation.  Mr. Hampton has spoken to his full time employer about the need for time off, and the employer has agreed to be flexible with his need for time off and will retain his position for him once he has recovered.  Mr. Hampton is also in the [employer].       Family History: Mr. Hampton was raised in [place].  He and his parents and 2 brothers moved here when his father was on sabbatical and never left.  His father is a .  His parents are still .  He has one older and one younger brother.  Mr. Hampton is  to his wife, Jaclyn Hampton, for 2 years.  They have a 5 month old baby girl, Jaclyn, who is healthy and meeting all developmental milestones.       Mental Health: Denies any past or present treatment for mental health issues.  Denies any need to see a counselor or therapist.  Denies any past suicidal ideation, plans, or past attempts.  Denies any use of psychotropic medications.  Denies any past history of hospitalization for psychiatric illness.     Alcohol and Drug Use/Abuse/Dependency: Denies any past history of abuse or dependency on alcohol or illicit drugs. Denies any current use of street drugs, including marijuana.  Denies any past history of negative consequences of use of alcohol or drugs such as a DUI.   Mr. Hampton reports that he currently consumes 4 to 5 beers per week on average.  He states that he stopped drinking entirely 2 weeks ago in preparation to go through living liver donor testing.       Cigarette Use: Mr. Hampton does not smoke.     Legal: denies any legal issues.     Coping Strategies/Support System: His wife is supportive of his desire to be a donor.  He has a strong Buddhism community and a lot of family support.  When  his wife had a surgery on her leg a couple of years ago, he said the his [employer] community brought them meals for 4 months.  He said that it was almost too much support!  He states that his peggy and his music give him great peace and solace from the stresses of every day life and he believes that this will get him though his surgical recovery.     DONOR SPECIFIC INFORMATION:  Relationship to Recipient: Mr. Hampton wants to donate a lobe of his liver to his father, . Claudia Hampton, age 59.  They are biologically related.  His father has had liver disease for decades and is not in need of transplant.     Decision Process/Motivation to Donate: Mr. Hampton is motivated to help his father live longer and have a better quality of life.  He wants his father to be able to live to see his grandchild grow up.  He denies feeling any pressure, coercion, or being offered any payment to be a donor.  Mr. Hampton has some trepidation about the potential for complications, but appears to be well educated about these risks.    PREPARATION FOR DONATION, RECOVERY, AND POTENTIAL SHORT-LONG-TERM OUTCOMES:  Understanding of the Living Donation Process:   We discussed the role of the donor  and Independent Donor Advocate.  Short and long term medical and psychosocial risks to both, donor and recipient were reviewed and he understand these risks as described by the team today.  High risk behaviors as defined by [place] Public Health Services (PHS) 2013 that have potential to increase risk of disease transmission were reviewed and he denies any high risk behaviors. Post surgical restrictions (2 weeks no driving, 6 weeks no lifting over 10 lbs) were reviewed and he appears capable of adhering to the post surgical requirements. The need for a caregiver was discussed and his wife will care for him post surgery.  They have a large [employer] community that can support the entire family .  The risk of poor psychosocial outcome including problems  with body image, post-surgery depression or anxiety, or feelings of emotional distress or bereavement if recipient experiences any recurrent disease, poor outcome or death was reviewed.  Additionally, potential financial implications, including the risk of having difficulty obtaining health care insurance, life insurance, disability insurance, or long term care insurance were reviewed, as were available donor grants to assist with donor related expenses.      Mr. Hampton appears capable of understanding this information and making an informed medical decision.    Impressions/Recommendations:   Mr. Fish Hampton  is highly motivated to donate a lobe of his liver to his father, Mr. Claudia Hampton, age 60.  His decision to donate is free of inducement, coercion, or other undue pressure.   His housing, finances and employment are stable.  No current/active mental health or chemical abuse issues were identified.  The need for a caregiver was reviewed and he is able to identify a plan to meet his post operative care needs.  He has a very strong Hoahaoism community to rely on for practical and emotional support.  His wife is supportive and will care for him post surgery.  His family and Hoahaoism community will also assist in the care of their 5 month old daughter.  Mr. Hampton appears capable of making an informed medical decision.  No psychosocial contraindications to living organ donation were identified and  I support Mr. Hampton s desire to continue the process of being evaluated as a potential living liver donor for his father, Mr. Claudia Hampton, age 60.     Contact Information:   FERN Dee, [facility]  Clinical  and Independent Donor Advocate  [facility]  Pager:  [phone]  Direct:  [phone]    Time Spent: 60 minutes      Living Liver Donor Consent per OPTN Policy 14.3.A for Independent Living Donor Advocate (YAW)    Written assurance has been obtained from the  potential donor that he/she:   Is willing to donate  Is free from inducement and coercion  Has been informed that the he/she may decline to donate at any time  Has been informed that transplant centers must:   A) Offer donors an opportunity to discontinue the donor consent or evaluation process in a way that is protected and confidential  B) Provide an independent living donor advocate (YAW) to assist the potential donor during this process    The following was presented to the potential donor in a language in which the potential donor is able to engage in meaningful dialogue:   Education and instruction about all phases of the living donation process including:   Consent  Medical and psychosocial evaluations  Pre and post operative care  Required post operative follow up  Disclosure that the [facility] will take all reasonable precautions to provide confidentiality for the donor/recipient  Disclosure that it is a federal crime for any person to knowingly acquire, obtain or otherwise transfer any human organ for valuable consideration  Disclosure that the [facility] must provide an independent living donor advocate (YAW)  Disclosure that health information obtained during the evaluation is subject to the same regulations as all records and could reveal conditions that must be reported to local, state, or federal public health authorities  Disclosure that the [facility] is required to report living donor follow up information at 6 months, 1 year, and 2 years, and that the potential donor must commit to post operative follow up testing coordinated by the [facility]    Disclosure has been provided that these risks may be transient or permanent & include but are not limited to:  Potential psychosocial risks:  Problems with body image  Post-surgery depression or anxiety  Feelings of emotional distress or bereavement if recipient experiences any recurrent disease or in the event of the  recipient s death  Impact of donation on the donor s lifestyle    Potential financial impacts:  Personal expenses of travel, housing, , lost wages related to donation might not be reimbursed. However, resources may be available to defray some donation-related expenses   Need for life-long follow up at the donor s expense  Loss of employment or income  Negative impact on the ability to obtain future employment  Negative impact on the ability to obtain, maintain, or afford health, disability, and life insurance  Future health problems experienced by living donors following donation may not be covered by the recipient s insurance    Contact Information:  FERN Dee, [facility]  Clinical  and Independent Donor Advocate  [facility]  Pager:  [phone]  Direct:  [phone]      Time Spent: 60 minutes

## 2018-06-14 NOTE — MR AVS SNAPSHOT
"              After Visit Summary   2018    Fish Hampton    MRN: [id number]           Patient Information     Date Of Birth          1990        Visit Information        Provider Department      2018 10:00 AM Danni Coulter MD [facility]        Today's Diagnoses     Liver donor           Follow-ups after your visit        Who to contact     If you have questions or need follow up information about today's clinic visit or your schedule please contact [facility] directly at [phone].  Normal or non-critical lab and imaging results will be communicated to you by MyChart, letter or phone within 4 business days after the clinic has received the results. If you do not hear from us within 7 days, please contact the clinic through Mobiscopehart or phone. If you have a critical or abnormal lab result, we will notify you by phone as soon as possible.  Submit refill requests through Canva or call your pharmacy and they will forward the refill request to us. Please allow 3 business days for your refill to be completed.          Additional Information About Your Visit        MyChart Information     Canva lets you send messages to your doctor, view your test results, renew your prescriptions, schedule appointments and more. To sign up, go to [email or web address] . Click on \"Log in\" on the left side of the screen, which will take you to the Welcome page. Then click on \"Sign up Now\" on the right side of the page.     You will be asked to enter the access code listed below, as well as some personal information. Please follow the directions to create your username and password.     Your access code is: [email or web address]  Expires: 2018  6:30 AM     Your access code will  in 90 days. If you need help or a new code, please call your [place] clinic or [phone].        Care EveryWhere ID     This is your Care EveryWhere ID. This could be used by other " "organizations to access your [place] medical records  [id number]        Your Vitals Were     Pulse Temperature Respirations Height Pulse Oximetry BMI (Body Mass Index)    68 98.2  F (36.8  C) (Oral) 18 1.93 m (6' 3.98\") 99% 29.23 kg/m2       Blood Pressure from Last 3 Encounters:   06/14/18 125/80   06/14/18 133/82   06/14/18 125/80    Weight from Last 3 Encounters:   06/14/18 108.9 kg (240 lb)   06/14/18 108.9 kg (240 lb)   06/14/18 108.9 kg (240 lb)              Today, you had the following     No orders found for display       Primary Care Provider Office Phone # Fax #    Ramirez Pierce [phone]       [facility] [address] [address]        Equal Access to Services     [facility]: Hadii kalyan palominoo Sobelinda, waaxda luqadaha, qaybta kaalmada ademartiyada, lily prabhakar . So [facility] [phone].    ATENCIÓN: Si habla español, tiene a catalan disposición servicios gratuitos de asistencia lingüística. Angel al [phone].    We comply with applicable federal civil rights laws and [place] laws. We do not discriminate on the basis of race, color, national origin, age, disability, sex, sexual orientation, or gender identity.            Thank you!     Thank you for choosing [facility]  for your care. Our goal is always to provide you with excellent care. Hearing back from our patients is one way we can continue to improve our services. Please take a few minutes to complete the written survey that you may receive in the mail after your visit with us. Thank you!             Your Updated Medication List - Protect others around you: Learn how to safely use, store and throw away your medicines at www.disposemymeds.org.          This list is accurate as of 6/14/18 11:59 PM.  Always use your most recent med list.                   Brand Name Dispense Instructions for use Diagnosis    ibuprofen 200 MG tablet    ADVIL/MOTRIN     Take 200 mg by " mouth every 4 hours as needed for mild pain

## 2018-06-14 NOTE — NURSING NOTE
Saw Fish Hampton in clinic during liver donor evaluation.  Came alone.  Has offered to be donor to father.  Discussed surgery hospitalization and recovery.  Knows when and how to obtain evaluation results and the process for scheduling surgery.  Has received and reviewed the donor education materials including donor DVD.  Reviewed SRTR datasheet.  Signed consent for donor evaluation.  Donor signed BRENT.  Requested routine cancer screening tests:   1.Pap   2. Mammogram  3. Colonoscopy.  Questions answered. Time spent:40 minutes.  Donor is a non smoker or smoker.  Potential donor consumes. See SW notes}ETOH  per day, week, month.  Donor has medical insurance Yes

## 2018-06-14 NOTE — LETTER
6/14/2018      RE: Fish Hampton  [address]  [place] [address]           SUBJECTIVE:  Fish Hampton is a 27 year old male presents for living liver donor evaluation.   Patient reports that 2 years ago he was evaluated for low back pain. He was seen by Sports Medicine and had MRI of the low back. He reports that he occasionally has exacerbations of the low back pain, most recent one was over the weekend. Patient also reports that he was diagnosed with a stress fracture which likely occurred in April. He also started a new job that required him to be on his feet. He will need to wear a boot, will have follow up in 6 weeks. He states that he has been taking ibuprofen as needed for back pain.     Past Medical History:   Diagnosis Date     Reduced vision      Scoliosis      There is no personal history of significant medical conditions which include but are not limited to hypertension, diabetes, renal diseases, lung disease, heart disease, gastrointestinal disease, autoimmune disease, neurologic disease, genitourinary disease, hematologic disorders, bleeding or clotting disorders, history of cancer, history of infections or obesity. No history of sychiatric illness, depression, suicide attempts .    Liver Specific History  No personal history of abnormal liver function tests, diabetes, fatty liver disease, jaundice, bleeding or unexplained pruritis. No history of DVT or pulmonary emboli to suggest an occult hypercoagulable state. No active and past hepatotoxic medications or chronic use of pain medications.         Family History   Problem Relation Age of Onset     DIABETES Maternal Grandfather      There is no reported family history of early coronary artery disease, specific cancers, bleeding and/or clotting disorders. No family history of liver disease, autoimmune disease, diabetes or viral hepatitis.    Social History     Social History     Marital status: Single     Spouse name: N/A     Number of  children: N/A     Years of education: N/A     Occupational History     Not on file.     Social History Main Topics     Smoking status: Never Smoker     Smokeless tobacco: Never Used     Alcohol use Yes      Comment: Occasionally     Drug use: No     Sexual activity: Yes     Partners: Female     Birth control/ protection: Pull-out method, Condom     Other Topics Concern     Not on file     Social History Narrative     1. Occupation, employment status, health insurance status, living arrangements, and social support   2. Smoking, alcohol and drug use and/or abuse   3. Psychiatric illness, depression, suicide attempts     Behavior/History Exclusionary Criteria ( Public Health Service High Risk Criteria).    The patient reports no history of the followin. Men who have had sex with another man in the preceding 5 years.  2. Persons who report nonmedical intravenous, intramuscular, or subcutaneous injection of drugs in the preceding 5 years.  3. Persons with hemophilia or related clotting disorders who have received human-derived clotting factor concentrates.  4. Men and women who have engaged in sex in exchange for money or drugs in the preceding 5 years.  5. Persons who have had sex in the preceding 12 months with any person described in items 1-4 above or with a person known or suspected to have HIV infection.  6. Persons who have been exposed in the preceding 12 months to known or suspected HIV-infected blood through percutaneous inoculation or through contact with an open wound, nonintact skin, or mucous membrane..  7. Inmates of correctional systems .          Review Of Systems    Constitutional: no fevers, chills, night sweats or unintentional weight change   Eyes: no vision change, diplopia or red eyes   Ears, Nose, Mouth, Throat: no tinnitus or hearing change, no epistaxis or nasal discharge, no oral lesions, throat clear   Cardiovascular: no chest pain, palpitations, or pain with walking, no orthopnea or  "PND   Respiratory: no dyspnea, cough, shortness of breath or wheezing   GI: no nausea, vomiting, diarrhea or constipation, no abdominal pain   : no change in urine, no dysuria or hematuria, no sexual dysfunction   Musculoskeletal: right foot pain  Integumentary: no concerning lesions or moles   Neuro: no loss of strength or sensation, no numbness or tingling, no tremor, no dizziness, no headache   Endo: no polyuria or polydipsia, no temperature intolerance   Heme/Lymph: no concerning bumps, no bleeding problems   Allergy: no environmental allergies   Psych: no depression or anxiety, no sleep problems    Current Outpatient Prescriptions   Medication     ibuprofen (ADVIL/MOTRIN) 200 MG tablet     No current facility-administered medications for this visit.        PHYSICAL EXAM:  /82 (BP Location: Right arm, Patient Position: Chair, Cuff Size: Adult Regular)  Pulse 68  Temp 98.2  F (36.8  C) (Oral)  Resp 18  Ht 1.93 m (6' 3.98\")  Wt 108.9 kg (240 lb)  SpO2 99%  BMI 29.23 kg/m2      Constitutional: no distress, comfortable, pleasant   Eyes: anicteric, normal extra-ocular movements   Ears, Nose and Throat: tympanic membranes clear, nose clear and free of lesions, throat clear, neck supple with full range of motion, no thyromegaly.   Cardiovascular: regular rate and rhythm, normal S1 and S2, no murmurs, rubs or gallops, peripheral pulses full and symmetric   Respiratory: clear to auscultation, no wheezes or crackles, normal breath sounds   Gastrointestinal: positive bowel sounds, nontender, no hepatosplenomegaly, no masses   Musculoskeletal: full range of motion, no edema   Skin: no concerning lesions, no jaundice. No spider angiomata, caput medusae, or other skin stigmata of liver disease.  Neurological: cranial nerves intact, normal strength and sensation, reflexes at patella and biceps normal, normal gait, no tremor   Psychological: appropriate mood   Lymphatic: no cervical, axillary or inguinal " lymphadenopathy      ASSESSMENT/PLAN:  Patient with recent stress fracture, being considered for live liver donation.  Recommendations.    1.  Recent stress fracture.  Recommend follow-up with primary care provider to exclude possible contributing factors such as vitamin D deficiency.  Patient was advised that liver donation should be postponed until the fracture is healed.  Follow-up with podiatry is currently planned by the patient.  2.  Other health concerns.  Patient has no other medical issues that would preclude him from being a liver donor.    REVIEW OF EXCLUSION CRITERIA     1. Fish Hampton is over age 18 years and is mentally capable of making an informed decision  2. HIV result is negative  3. HCV RNA test result is negative.  4. HBsAg test result is negative.  5. Donors with ZZ, Z-null, null-null and S-null alpha-1-antitrypsin phenotypes and untype-able phenotypes: Per transplant team.  6. There is no evidence of active malignancy, or incompletely treated malignancy  7.There is no suspicion of donor coercion  8. There is no suspicion of illegal financial exchange between donor and recipient.  9. There is no evidence of acute symptomatic infection  10. There is no evidence of uncontrolled diagnosable psychiatric conditions requiring treatment before donation, including any evidence of current suicidality  11. There is no evidence of untreated and active substance abuse  12. Donor remnant volume less than 30% of native liver volume: Per transplant team.    Review of Relative Contraindications     1. There is no evidence of diabetes or impaired fasting glucose with other features of the metabolic syndrome (low HDL and high triglycerides)  2. There is no evidence of unexplained liver function test (LFT) abnormalities  3. Vascular or biliary anatomy in the donor liver that makes the likelihood of successful transplantation low or increases the risk in the potential donor: Per transplant team.  4. There is no  evidence of multiple or complex upper abdominal surgeries   5. Significant hepatic steatosis: Per transplant team  6. There is no evidence of chronic kidney disease.  7. There is no evidence of a significant history of thrombosis or embolism.  8. There is no evidence of bleeding disorders.  9. His BMI is less than 35 Kg/m2.  10. His age is less than 60 years.  11. There is no evidence of clinically significant cardiovascular disease.  12. There is no evidence of clinically significant pulmonary disease.  13. There is no evidence of history of cancer.  14. There is no evidence of prior history of substance abuse.  15. There is sufficient family, caregiver, social, and/or economic support.  16. There is no evidence of trained donor/recipient relationship.  17. Does the patient have health insurance  18. The patient does not smoke.  19. Active oral contraception. (not applicable)        Recommended Testing (see labs already completed in EHR):    General Laboratory Tests:   1. Complete blood count (CBC)   2. Blood type and screen   3. Partial thromboplastin time (PTT)   4. International Normalized Ratio (INR) or Prothrombin time (PT)   5. Metabolic testing (electrolytes, BUN, creatinine, albumin, calcium, phosphorus)   6. Pregnancy test for premenopausal women without surgical sterilization   7. Chest X-Ray   8. Electrocardiogram (ECG)   9. Urinalysis   10. Hypercoagulable state evaluation: lupus anticoagulant, F2Uzuwlp, PT Gene Mutation  11. 10 year CV Risk calculation  12. PFTs for smokers    Other Metabolic Testin. Fasting blood glucose   2. Fasting lipid profile (cholesterol, triglycerides, HDL-cholesterol, and LDL-cholesterol)   3. Glycosylated hemoglobin in first degree relatives of diabetics and in high risk individuals such as those with metabolic syndrome     Liver-Specific Tests:   1. Hepatic function panel   2. Ceruloplasmin in a donor with family history of Landry s Disease   3. Iron, iron binding  capacity, ferritin   4. Alpha-1-antitrypsin level: those with low alpha-1-antitrypsin levels should have a phenotype.   5. JOSH, Anti-Smooth Muscle Abs  Infectious disease testing must include:   1. CMV (cytomegalovirus) Antibody   2. EBV (Kristal Barr virus) Antibody   3. HIV 1,2 (human immunodeficiency virus) antibody testing   4. HepBsAg (hepatitis B surface antigen)   5. HepBcAb (hepatitis B core antibody) *   6. HepBsAb (hepatitis B surface antibody)   7. HCV (hepatitis C virus) antibody testing   8. RPR (Rapid Plasma Reagin test for syphilis)   * HBV DNA must be tested if HBcAB is positive   9)  PPD or Quantiferon Gold  10)  Testing is considered but the patient is not from an endemic area for  Strongyloides or Trypanosoma cruzi and has no symptoms to suggest West Nile virus infection.    Age and Risk-factor Specific Cancer Screenin. Cervical Cancer   2. Breast Cancer   3. Prostate Cancer   4. Colon Cancer   5. Skin Cancer   6. Lung cancer     Danni Coulter  Internal Medicine and Pediatrics  [facility]   pager [phone]

## 2018-06-14 NOTE — NURSING NOTE
Patient is here for liver donor evaluation.  Patient is accompanied by no one; here alone.  Reviewed schedule with patient.    Patient ready for provider evaluation.  Brant Greenfield LPN 6:52 AM 06/14/18

## 2018-06-14 NOTE — MR AVS SNAPSHOT
"              After Visit Summary   6/14/2018    Fish Hampton    MRN: [id number]           Patient Information     Date Of Birth          1990        Visit Information        Provider Department      6/14/2018 9:00 AM Claudio Coombs MD [facility] Solid Organ Transplant        Today's Diagnoses     Transplant donor evaluation    -  1       Follow-ups after your visit        Your next 10 appointments already scheduled     Jun 26, 2018  9:00 AM CDT   (Arrive by 8:30 AM)   NEW SPINE with Nadir Jones MD   [facility] ([facility] [place])    [address] [place] [id number] [phone]              Who to contact     If you have questions or need follow up information about today's clinic visit or your schedule please contact [facility] SOLID ORGAN TRANSPLANT directly at [phone].  Normal or non-critical lab and imaging results will be communicated to you by MyChart, letter or phone within 4 business days after the clinic has received the results. If you do not hear from us within 7 days, please contact the clinic through MyChart or phone. If you have a critical or abnormal lab result, we will notify you by phone as soon as possible.  Submit refill requests through PhysioSonics or call your pharmacy and they will forward the refill request to us. Please allow 3 business days for your refill to be completed.          Additional Information About Your Visit        MyChart Information     [employer]ics lets you send messages to your doctor, view your test results, renew your prescriptions, schedule appointments and more. To sign up, go to [email or web address] . Click on \"Log in\" on the left side of the screen, which will take you to the Welcome page. Then click on \"Sign up Now\" on the right side of the page.     You will be asked to enter the access code listed below, as well as some personal information. Please follow the directions to create " "your username and password.     Your access code is: [email or web address]  Expires: 2018  6:30 AM     Your access code will  in 90 days. If you need help or a new code, please call your [facility] or [phone].        Care EveryWhere ID     This is your Care EveryWhere ID. This could be used by other organizations to access your [place] medical records  [id number]        Your Vitals Were     Pulse Temperature Respirations Height Pulse Oximetry BMI (Body Mass Index)    52 98.2  F (36.8  C) (Oral) 18 1.93 m (6' 3.98\") 99% 29.23 kg/m2       Blood Pressure from Last 3 Encounters:   18 125/80   18 133/82   18 125/80    Weight from Last 3 Encounters:   18 108.9 kg (240 lb)   18 108.9 kg (240 lb)   18 108.9 kg (240 lb)              Today, you had the following     No orders found for display       Primary Care Provider Office Phone # Fax #    Ramirez Pierce [phone]       [address] [place] [id number]        Equal Access to Services     SADE BHAKTA : Hadii kalyan nielson hadasho Soalexiali, waaxda luqadaha, qaybta kaalmada adeegyada, lily landa. So [facility] [phone].    ATENCIÓN: Si habla español, tiene a catalan disposición servicios gratuitos de asistencia lingüística. Lladeola al [phone].    We comply with applicable federal civil rights laws and [place] laws. We do not discriminate on the basis of race, color, national origin, age, disability, sex, sexual orientation, or gender identity.            Thank you!     Thank you for choosing [facility] SOLID ORGAN TRANSPLANT  for your care. Our goal is always to provide you with excellent care. Hearing back from our patients is one way we can continue to improve our services. Please take a few minutes to complete the written survey that you may receive in the mail after your visit with us. Thank you!             Your Updated Medication List - Protect others around you: " Learn how to safely use, store and throw away your medicines at www.disposemymeds.org.          This list is accurate as of 6/14/18 11:59 PM.  Always use your most recent med list.                   Brand Name Dispense Instructions for use Diagnosis    ibuprofen 200 MG tablet    ADVIL/MOTRIN     Take 200 mg by mouth every 4 hours as needed for mild pain

## 2018-06-14 NOTE — MR AVS SNAPSHOT
"              After Visit Summary   2018    Fish Hampton    MRN: [id number]           Patient Information     Date Of Birth          1990        Visit Information        Provider Department      2018 12:00 PM Jesu Lagunas MD [facility] Solid Organ Transplant        Today's Diagnoses     Liver donor    -  1       Follow-ups after your visit        Who to contact     If you have questions or need follow up information about today's clinic visit or your schedule please contact [facility] SOLID ORGAN TRANSPLANT directly at [phone].  Normal or non-critical lab and imaging results will be communicated to you by Selleroutlethart, letter or phone within 4 business days after the clinic has received the results. If you do not hear from us within 7 days, please contact the clinic through iSkoott or phone. If you have a critical or abnormal lab result, we will notify you by phone as soon as possible.  Submit refill requests through SmartK[employer] or call your pharmacy and they will forward the refill request to us. Please allow 3 business days for your refill to be completed.          Additional Information About Your Visit        MyChart Information     [employer] lets you send messages to your doctor, view your test results, renew your prescriptions, schedule appointments and more. To sign up, go to [email or web address] . Click on \"Log in\" on the left side of the screen, which will take you to the Welcome page. Then click on \"Sign up Now\" on the right side of the page.     You will be asked to enter the access code listed below, as well as some personal information. Please follow the directions to create your username and password.     Your access code is: [email or web address]  Expires: 2018  6:30 AM     Your access code will  in 90 days. If you need help or a new code, please call your [facility] or [phone].        Care EveryWhere ID     This is your Care EveryWhere ID. This could be used by other " "organizations to access your [place] medical records  [id number]        Your Vitals Were     Pulse Temperature Respirations Height Pulse Oximetry BMI (Body Mass Index)    52 98.2  F (36.8  C) (Oral) 18 1.93 m (6' 3.98\") 99% 29.23 kg/m2       Blood Pressure from Last 3 Encounters:   06/14/18 125/80   06/14/18 133/82   06/14/18 125/80    Weight from Last 3 Encounters:   06/14/18 108.9 kg (240 lb)   06/14/18 108.9 kg (240 lb)   06/14/18 108.9 kg (240 lb)              Today, you had the following     No orders found for display       Primary Care Provider Office Phone # Fax #    Ramirez Pierce [phone]       [facility] [address] [address]        Equal Access to Services     [facility]: Hadii kalyan palominoo Sobelinda, waaxda luqadaha, qaybta kaalmada ademartiyada, lily prabhakar . So [facility] [phone].    ATENCIÓN: Si habla español, tiene a catalan disposición servicios gratuitos de asistencia lingüística. Angel al [phone].    We comply with applicable federal civil rights laws and [place] laws. We do not discriminate on the basis of race, color, national origin, age, disability, sex, sexual orientation, or gender identity.            Thank you!     Thank you for choosing [facility] SOLID ORGAN TRANSPLANT  for your care. Our goal is always to provide you with excellent care. Hearing back from our patients is one way we can continue to improve our services. Please take a few minutes to complete the written survey that you may receive in the mail after your visit with us. Thank you!             Your Updated Medication List - Protect others around you: Learn how to safely use, store and throw away your medicines at www.disposemymeds.org.          This list is accurate as of 6/14/18 11:59 PM.  Always use your most recent med list.                   Brand Name Dispense Instructions for use Diagnosis    ibuprofen 200 MG tablet    ADVIL/MOTRIN     Take 200 mg by " mouth every 4 hours as needed for mild pain

## 2018-06-14 NOTE — PROGRESS NOTES
HPI      ROS      Physical Exam    Assessment and Plan:  1. Prospective liver transplant donor Left  lobe with some issues that need to be addressed prior to donation.  Needs to see orthopaedics, and complete the full evaluation.    Summary of the surgical evaluation:  1. CT scan: reviewed the left lobe volume is 668 cc  2. Portal vein anatomy: Type A single vein/possible two portal veins       3. MRCP/MRI reviewed: bile duct anatomy:Type 2 number of ducts 1  4.        5. Hepatic artery anatomy: Type 1     6. Liver biopsy: Not Done  7. History of hypercoagulability No  8. History of hyperlipidaemia No  9. Donor BMI Body mass index is 29.23 kg/(m^2).  10. Donor Serology reviewed and negative  11. Cardiac Evaluation: needs echocardiogram    Discussed the surgical risks of liver donation, please refer to detailed note below.   Patients overall evaluation will be dicussed with the transplant team and a final recommendation on the patients's suitability to be a liver donation will be made at that time.    Detailed Consult Note:  Fish Hampton was seen in consultation at the request of Dr. Coulter  for evaluation as a potential liver transplant donor.    Reason for Visit:  Fish Hampton is a 27 year old year old male who presents for a liver donor evaluation.    HPI:  Wants to donate part of his liver to his father who is about 68Kg, PSC    Medical Hx:       h/o Liver Problems: No            h/o HTN: No Usual BP: 120       h/o DM: No    h/o Kidney Problems: No  H/o Pancreas problems: No  H/o Hepatitis: No  H/o malignancy: No  H/o peptic ulcer: No  H/o Asthma: No  H/o Sleep apnea: No  H/o mental illness: No  H/o illicit drug use: No  H/o Alcohol use: Yes 4-5 beers per week  H/o bleeding or clotting disorders: No         Cardiovascular Hx:       h/o Cardiac Issues: No       Exercise Tolerance: no chest pain or shortness of breath with exertion.         Health Maintenance:        Dental: Not up to date    ROS:   CONSTITUTIONAL:  " No fevers or chills  EYES: negative for icterus  ENT:  negative for hearing loss, tinnitus and sore throat  RESPIRATORY:  negative for cough, sputum, dyspnea  CARDIOVASCULAR:  negative for chest pain   GASTROINTESTINAL:  negative for nausea, vomiting, diarrhea or constipation  GENITOURINARY:  negative for incontinence, dysuria, bladder emptying problems  HEME:  No easy bruising  INTEGUMENT:  negative for rash and pruritus  NEURO:  Negative for headache, seizure disorder    PMH: History was taken from the patient as noted below.  Past Medical History:   Diagnosis Date     Reduced vision      Scoliosis      PSH: Personal or family history of bleeding or anesthesia problems No.  Family Hx:   Family History   Problem Relation Age of Onset     DIABETES Maternal Grandfather      Personal Hx:   Social History     Social History     Marital status: Single     Spouse name: N/A     Number of children: N/A     Years of education: N/A     Occupational History     Not on file.     Social History Main Topics     Smoking status: Never Smoker     Smokeless tobacco: Never Used     Alcohol use Yes      Comment: Occasionally     Drug use: No     Sexual activity: Yes     Partners: Female     Birth control/ protection: Pull-out method, Condom     Other Topics Concern     Not on file     Social History Narrative     Allergies:  No Known Allergies  Medications:  Prior to Admission medications    Medication Sig Start Date End Date Taking? Authorizing Provider   ibuprofen (ADVIL/MOTRIN) 200 MG tablet Take 200 mg by mouth every 4 hours as needed for mild pain    Reported, Patient     Vitals:  /80 (Patient Position: Sitting)  Pulse 52  Temp 98.2  F (36.8  C) (Oral)  Resp 18  Ht 1.93 m (6' 3.98\")  Wt 108.9 kg (240 lb)  SpO2 99%  BMI 29.23 kg/m2    Exam:   GENERAL APPEARANCE: alert and no distress  HENT: mouth without ulcers or lesions  RESP: lungs clear to auscultation - no rales, rhonchi or wheezes  CV: regular rhythm, normal " rate, no rub, no murmur  EDEMA: no LE edema bilaterally  SKIN: no rash  LYMPHATICS: No axillary, cervical, inguinal, or supraclavicular nodes  ABDOMEN:  soft, nontender, no HSM or masses and bowel sounds normal  MS: extremities normal- no gross deformities noted, no evidence of inflammation in joints, no muscle tenderness    Results: The following results have been ordered and reviewed.  Recent Results (from the past 168 hour(s))   EKG 12-lead complete with read (future)    Collection Time: 06/14/18  6:17 AM   Result Value Ref Range    Interpretation ECG Click View Image link to view waveform and result    GGT    Collection Time: 06/14/18  7:43 AM   Result Value Ref Range    GGT 12 0 - 75 U/L   Lipase    Collection Time: 06/14/18  7:43 AM   Result Value Ref Range    Lipase 68 (L) 73 - 393 U/L   Phosphorus    Collection Time: 06/14/18  7:43 AM   Result Value Ref Range    Phosphorus 3.6 2.5 - 4.5 mg/dL   Iron and iron binding capacity    Collection Time: 06/14/18  7:43 AM   Result Value Ref Range    Iron 78 35 - 180 ug/dL    Iron Binding Cap 299 240 - 430 ug/dL    Iron Saturation Index 26 15 - 46 %   Ferritin    Collection Time: 06/14/18  7:43 AM   Result Value Ref Range    Ferritin 152 26 - 388 ng/mL   Lipid Profile    Collection Time: 06/14/18  7:43 AM   Result Value Ref Range    Cholesterol 87 <200 mg/dL    Triglycerides 35 <150 mg/dL    HDL Cholesterol 34 (L) >39 mg/dL    LDL Cholesterol Calculated 46 <100 mg/dL    Non HDL Cholesterol 53 <130 mg/dL   Comprehensive metabolic panel    Collection Time: 06/14/18  7:43 AM   Result Value Ref Range    Sodium 140 133 - 144 mmol/L    Potassium 4.0 3.4 - 5.3 mmol/L    Chloride 107 94 - 109 mmol/L    Carbon Dioxide 27 20 - 32 mmol/L    Anion Gap 7 3 - 14 mmol/L    Glucose 95 70 - 99 mg/dL    Urea Nitrogen 16 7 - 30 mg/dL    Creatinine 0.82 0.66 - 1.25 mg/dL    GFR Estimate >90 >60 mL/min/1.7m2    GFR Estimate If Black >90 >60 mL/min/1.7m2    Calcium 8.7 8.5 - 10.1 mg/dL     Bilirubin Total 0.9 0.2 - 1.3 mg/dL    Albumin 4.4 3.4 - 5.0 g/dL    Protein Total 7.8 6.8 - 8.8 g/dL    Alkaline Phosphatase 62 40 - 150 U/L    ALT 38 0 - 70 U/L    AST 19 0 - 45 U/L   INR    Collection Time: 06/14/18  7:43 AM   Result Value Ref Range    INR 1.00 0.86 - 1.14   Partial thromboplastin time    Collection Time: 06/14/18  7:43 AM   Result Value Ref Range    PTT 30 22 - 37 sec   CBC with platelets    Collection Time: 06/14/18  7:43 AM   Result Value Ref Range    WBC 4.0 4.0 - 11.0 10e9/L    RBC Count 5.76 4.4 - 5.9 10e12/L    Hemoglobin 16.4 13.3 - 17.7 g/dL    Hematocrit 45.7 40.0 - 53.0 %    MCV 79 78 - 100 fl    MCH 28.5 26.5 - 33.0 pg    MCHC 35.9 31.5 - 36.5 g/dL    RDW 11.9 10.0 - 15.0 %    Platelet Count 171 150 - 450 10e9/L   ABO/Rh type and screen    Collection Time: 06/14/18  7:44 AM   Result Value Ref Range    ABO O     RH(D) Neg     Antibody Screen Neg     Test Valid Only At          [facility]    Specimen Expires 06/17/2018    Routine UA with microscopic    Collection Time: 06/14/18  7:52 AM   Result Value Ref Range    Color Urine Yellow     Appearance Urine Clear     Glucose Urine Negative NEG^Negative mg/dL    Bilirubin Urine Negative NEG^Negative    Ketones Urine Negative NEG^Negative mg/dL    Specific Gravity Urine 1.023 1.003 - 1.035    Blood Urine Negative NEG^Negative    pH Urine 6.0 5.0 - 7.0 pH    Protein Albumin Urine Negative NEG^Negative mg/dL    Urobilinogen mg/dL 0.0 0.0 - 2.0 mg/dL    Nitrite Urine Negative NEG^Negative    Leukocyte Esterase Urine Negative NEG^Negative    Source Midstream Urine     WBC Urine <1 0 - 5 /HPF    RBC Urine 1 0 - 2 /HPF    Mucous Urine Present (A) NEG^Negative /LPF    Amorphous Crystals Few (A) NEG^Negative /HPF       I spent 60 min with the patient more than 1/2 the time was spent direct face-to-face counseling; the  surgical risks we discussed included but were not limited to    1. Pain  discomfort   and complications of the investigations during the donor evaluation process and the surgical procedure.  2. Description of the donor operation:  Depending on the size and requirement of the recipient, the donor may donate the left lateral segment about 20% of the liver or the left lobe of the liver about 35% of the liver or the right lobe of the liver about 65% of the liver. On the day before the operation the donor will not be allowed to eat for about eight hours before the operation. The operation is done under general anaesthesia. A long incision on the right side of the abdomen with an extension into the midline of the made. After deciding the liver is normal and suitable for donation the surgeon will remove the gallbladder and cannulate the cystic duct for  cholangiography. The gallbladder is removed because it lies in the midplane of the liver and will interfere with the liver transaction. The absence of the gallbladder in the donor should not in the normal course affect the quality of life afterwards. The relevant inflow and outflow blood vessels and bile duct of  the liver are isolated  at the plane depending on the type of liver donation. Finally the donated part of the liver is removed from the relevant inflow and outflow blood vessels are divided and ligated. The wound is then sutured.  The duration of the operation is approximately 6 hours but it may be longer there are possibilities that the donor operation aborted  because of the following reasons:  1. unexpected anaesthetic complications  2. The donor liver is found to be of abnormal and not suitable for organ donation  3.  The recipient is found for various reasons not to be suitable for liver transplantation after the laparotomy is performed .   The members of the [facility] transplant team are  meticulous in the preoperative assessment of the donor and recipient suitability for   transplantation but there is always the possibility of  unexpected findings at laparotomy the deter the transplant operation. In this situation the donor may have abdominal wound although she or he has not gone through the donor  hepatectomy.     The donor operation is performed simultaneously with the liver recipient operation in order to match the time of the removal of the diseased liver and the implantation of the new liver. There is also a small possibility that after the donor hepatectomy is completed,  the  intended recipient  is not suitable for transplantation; In that event, the donor agrees/disagrees gives consent for this part of liver to be used in another suitable recipient  as felt appropriate by the   [facility] transplant  liver team, so that the  donor liver may be used and not discarded .    3. average hospital stay of 7 to 14 days  4. average loss of work of 6 to 12 weeks  5. a permanent surgical scar  6. complication rate of about 40 to 50%; wound infection, hernia, bleeding that may require a second operation, bile leak that may require endoscopic- stenting  or second operation, bile duct problems long-term, intra-abdominal infection pneumonia, deep-vein thrombosis, pulmonary embolism, heart attack, liver failure that may require a liver transplant, incisional hernia , adhesive small bowel obstruction,  urinary tract infection and small risk of fatigue long-term.  7. possibility of death: the risk is 1:200 to 1 in 300; two donor deaths have been reported in [place] in the last five years.   8. The donor states that his/her decisions for liver donation is truly voluntary and there has not been any external pressure from the hospital staff or other family members. The donor understands that there will not be any financial gain related to that of the nation. The donor also understands that she has the right to withdraw consent for donation and at any time without the need of giving any reason to do so.  9. The donor   was encouraged to watch the the [facility]   video on LIVER donation  10. Patient understands all the above stated and wishes to proceed to liver donation.    Copy to patient  JUAREZ PADGETT    [address] [place] [address]

## 2018-06-14 NOTE — LETTER
6/14/2018       RE: Fish Hampton  [address]  [place] [address]     Dear Colleague,    Thank you for referring your patient, Fish Hampton, to the [facility]GAN TRANSPL[facility] at [facility]. Please see a copy of my visit note below.    HPI      ROS      Physical Exam    Assessment and Plan:  1. Prospective liver transplant donor Left  lobe with some issues that need to be addressed prior to donation.  Needs to see orthopaedics, and complete the full evaluation.    Summary of the surgical evaluation:  1. CT scan: reviewed the left lobe volume is 668 cc  2. Portal vein anatomy: Type A single vein/possible two portal veins       3. MRCP/MRI reviewed: bile duct anatomy:Type 2 number of ducts 1  4.        5. Hepatic artery anatomy: Type 1     6. Liver biopsy: Not Done  7. History of hypercoagulability No  8. History of hyperlipidaemia No  9. Donor BMI Body mass index is 29.23 kg/(m^2).  10. Donor Serology reviewed and negative  11. Cardiac Evaluation: needs echocardiogram    Discussed the surgical risks of liver donation, please refer to detailed note below.   Patients overall evaluation will be dicussed with the transplant team and a final recommendation on the patients's suitability to be a liver donation will be made at that time.    Detailed Consult Note:  Fish Hampton was seen in consultation at the request of Dr. Coulter  for evaluation as a potential liver transplant donor.    Reason for Visit:  Fish Hampton is a 27 year old year old male who presents for a liver donor evaluation.    HPI:  Wants to donate part of his liver to his father who is about 68Kg, PSC    Medical Hx:       h/o Liver Problems: No            h/o HTN: No Usual BP: 120       h/o DM: No    h/o Kidney Problems: No  H/o Pancreas problems: No  H/o Hepatitis: No  H/o malignancy: No  H/o peptic ulcer: No  H/o Asthma: No  H/o Sleep apnea: No  H/o mental illness: No  H/o illicit drug use: No  H/o  Alcohol use: Yes 4-5 beers per week  H/o bleeding or clotting disorders: No         Cardiovascular Hx:       h/o Cardiac Issues: No       Exercise Tolerance: no chest pain or shortness of breath with exertion.         Health Maintenance:        Dental: Not up to date    ROS:   CONSTITUTIONAL:  No fevers or chills  EYES: negative for icterus  ENT:  negative for hearing loss, tinnitus and sore throat  RESPIRATORY:  negative for cough, sputum, dyspnea  CARDIOVASCULAR:  negative for chest pain   GASTROINTESTINAL:  negative for nausea, vomiting, diarrhea or constipation  GENITOURINARY:  negative for incontinence, dysuria, bladder emptying problems  HEME:  No easy bruising  INTEGUMENT:  negative for rash and pruritus  NEURO:  Negative for headache, seizure disorder    PMH: History was taken from the patient as noted below.  Past Medical History:   Diagnosis Date     Reduced vision      Scoliosis      PSH: Personal or family history of bleeding or anesthesia problems No.  Family Hx:   Family History   Problem Relation Age of Onset     DIABETES Maternal Grandfather      Personal Hx:   Social History     Social History     Marital status: Single     Spouse name: N/A     Number of children: N/A     Years of education: N/A     Occupational History     Not on file.     Social History Main Topics     Smoking status: Never Smoker     Smokeless tobacco: Never Used     Alcohol use Yes      Comment: Occasionally     Drug use: No     Sexual activity: Yes     Partners: Female     Birth control/ protection: Pull-out method, Condom     Other Topics Concern     Not on file     Social History Narrative     Allergies:  No Known Allergies  Medications:  Prior to Admission medications    Medication Sig Start Date End Date Taking? Authorizing Provider   ibuprofen (ADVIL/MOTRIN) 200 MG tablet Take 200 mg by mouth every 4 hours as needed for mild pain    Reported, Patient     Vitals:  /80 (Patient Position: Sitting)  Pulse 52  Temp 98.2  " F (36.8  C) (Oral)  Resp 18  Ht 1.93 m (6' 3.98\")  Wt 108.9 kg (240 lb)  SpO2 99%  BMI 29.23 kg/m2    Exam:   GENERAL APPEARANCE: alert and no distress  HENT: mouth without ulcers or lesions  RESP: lungs clear to auscultation - no rales, rhonchi or wheezes  CV: regular rhythm, normal rate, no rub, no murmur  EDEMA: no LE edema bilaterally  SKIN: no rash  LYMPHATICS: No axillary, cervical, inguinal, or supraclavicular nodes  ABDOMEN:  soft, nontender, no HSM or masses and bowel sounds normal  MS: extremities normal- no gross deformities noted, no evidence of inflammation in joints, no muscle tenderness    Results: The following results have been ordered and reviewed.  Recent Results (from the past 168 hour(s))   EKG 12-lead complete with read (future)    Collection Time: 06/14/18  6:17 AM   Result Value Ref Range    Interpretation ECG Click View Image link to view waveform and result    GGT    Collection Time: 06/14/18  7:43 AM   Result Value Ref Range    GGT 12 0 - 75 U/L   Lipase    Collection Time: 06/14/18  7:43 AM   Result Value Ref Range    Lipase 68 (L) 73 - 393 U/L   Phosphorus    Collection Time: 06/14/18  7:43 AM   Result Value Ref Range    Phosphorus 3.6 2.5 - 4.5 mg/dL   Iron and iron binding capacity    Collection Time: 06/14/18  7:43 AM   Result Value Ref Range    Iron 78 35 - 180 ug/dL    Iron Binding Cap 299 240 - 430 ug/dL    Iron Saturation Index 26 15 - 46 %   Ferritin    Collection Time: 06/14/18  7:43 AM   Result Value Ref Range    Ferritin 152 26 - 388 ng/mL   Lipid Profile    Collection Time: 06/14/18  7:43 AM   Result Value Ref Range    Cholesterol 87 <200 mg/dL    Triglycerides 35 <150 mg/dL    HDL Cholesterol 34 (L) >39 mg/dL    LDL Cholesterol Calculated 46 <100 mg/dL    Non HDL Cholesterol 53 <130 mg/dL   Comprehensive metabolic panel    Collection Time: 06/14/18  7:43 AM   Result Value Ref Range    Sodium 140 133 - 144 mmol/L    Potassium 4.0 3.4 - 5.3 mmol/L    Chloride 107 94 - " 109 mmol/L    Carbon Dioxide 27 20 - 32 mmol/L    Anion Gap 7 3 - 14 mmol/L    Glucose 95 70 - 99 mg/dL    Urea Nitrogen 16 7 - 30 mg/dL    Creatinine 0.82 0.66 - 1.25 mg/dL    GFR Estimate >90 >60 mL/min/1.7m2    GFR Estimate If Black >90 >60 mL/min/1.7m2    Calcium 8.7 8.5 - 10.1 mg/dL    Bilirubin Total 0.9 0.2 - 1.3 mg/dL    Albumin 4.4 3.4 - 5.0 g/dL    Protein Total 7.8 6.8 - 8.8 g/dL    Alkaline Phosphatase 62 40 - 150 U/L    ALT 38 0 - 70 U/L    AST 19 0 - 45 U/L   INR    Collection Time: 06/14/18  7:43 AM   Result Value Ref Range    INR 1.00 0.86 - 1.14   Partial thromboplastin time    Collection Time: 06/14/18  7:43 AM   Result Value Ref Range    PTT 30 22 - 37 sec   CBC with platelets    Collection Time: 06/14/18  7:43 AM   Result Value Ref Range    WBC 4.0 4.0 - 11.0 10e9/L    RBC Count 5.76 4.4 - 5.9 10e12/L    Hemoglobin 16.4 13.3 - 17.7 g/dL    Hematocrit 45.7 40.0 - 53.0 %    MCV 79 78 - 100 fl    MCH 28.5 26.5 - 33.0 pg    MCHC 35.9 31.5 - 36.5 g/dL    RDW 11.9 10.0 - 15.0 %    Platelet Count 171 150 - 450 10e9/L   ABO/Rh type and screen    Collection Time: 06/14/18  7:44 AM   Result Value Ref Range    ABO O     RH(D) Neg     Antibody Screen Neg     Test Valid Only At          [facility]    Specimen Expires 06/17/2018    Routine UA with microscopic    Collection Time: 06/14/18  7:52 AM   Result Value Ref Range    Color Urine Yellow     Appearance Urine Clear     Glucose Urine Negative NEG^Negative mg/dL    Bilirubin Urine Negative NEG^Negative    Ketones Urine Negative NEG^Negative mg/dL    Specific Gravity Urine 1.023 1.003 - 1.035    Blood Urine Negative NEG^Negative    pH Urine 6.0 5.0 - 7.0 pH    Protein Albumin Urine Negative NEG^Negative mg/dL    Urobilinogen mg/dL 0.0 0.0 - 2.0 mg/dL    Nitrite Urine Negative NEG^Negative    Leukocyte Esterase Urine Negative NEG^Negative    Source Midstream Urine     WBC Urine <1 0 - 5 /HPF    RBC Urine 1 0 - 2  /HPF    Mucous Urine Present (A) NEG^Negative /LPF    Amorphous Crystals Few (A) NEG^Negative /HPF       I spent 60 min with the patient more than 1/2 the time was spent direct face-to-face counseling; the  surgical risks we discussed included but were not limited to    1. Pain  discomfort  and complications of the investigations during the donor evaluation process and the surgical procedure.  2. Description of the donor operation:  Depending on the size and requirement of the recipient, the donor may donate the left lateral segment about 20% of the liver or the left lobe of the liver about 35% of the liver or the right lobe of the liver about 65% of the liver. On the day before the operation the donor will not be allowed to eat for about eight hours before the operation. The operation is done under general anaesthesia. A long incision on the right side of the abdomen with an extension into the midline of the made. After deciding the liver is normal and suitable for donation the surgeon will remove the gallbladder and cannulate the cystic duct for  cholangiography. The gallbladder is removed because it lies in the midplane of the liver and will interfere with the liver transaction. The absence of the gallbladder in the donor should not in the normal course affect the quality of life afterwards. The relevant inflow and outflow blood vessels and bile duct of  the liver are isolated  at the plane depending on the type of liver donation. Finally the donated part of the liver is removed from the relevant inflow and outflow blood vessels are divided and ligated. The wound is then sutured.  The duration of the operation is approximately 6 hours but it may be longer there are possibilities that the donor operation aborted  because of the following reasons:  1. unexpected anaesthetic complications  2. The donor liver is found to be of abnormal and not suitable for organ donation  3.  The recipient is found for various  reasons not to be suitable for liver transplantation after the laparotomy is performed .   The members of the [facility] transplant team are  meticulous in the preoperative assessment of the donor and recipient suitability for   transplantation but there is always the possibility of unexpected findings at laparotomy the deter the transplant operation. In this situation the donor may have abdominal wound although she or he has not gone through the donor  hepatectomy.     The donor operation is performed simultaneously with the liver recipient operation in order to match the time of the removal of the diseased liver and the implantation of the new liver. There is also a small possibility that after the donor hepatectomy is completed,  the  intended recipient  is not suitable for transplantation; In that event, the donor agrees/disagrees gives consent for this part of liver to be used in another suitable recipient  as felt appropriate by the   [facility] transplant  liver team, so that the  donor liver may be used and not discarded .    3. average hospital stay of 7 to 14 days  4. average loss of work of 6 to 12 weeks  5. a permanent surgical scar  6. complication rate of about 40 to 50%; wound infection, hernia, bleeding that may require a second operation, bile leak that may require endoscopic- stenting  or second operation, bile duct problems long-term, intra-abdominal infection pneumonia, deep-vein thrombosis, pulmonary embolism, heart attack, liver failure that may require a liver transplant, incisional hernia , adhesive small bowel obstruction,  urinary tract infection and small risk of fatigue long-term.  7. possibility of death: the risk is 1:200 to 1 in 300; two donor deaths have been reported in [place] in the last five years.   8. The donor states that his/her decisions for liver donation is truly voluntary and there has not been any external pressure from the  hospital staff or other family members. The donor understands that there will not be any financial gain related to that of the nation. The donor also understands that she has the right to withdraw consent for donation and at any time without the need of giving any reason to do so.  9. The donor  was encouraged to watch the the [facility]   video on LIVER donation  10. Patient understands all the above stated and wishes to proceed to liver donation.        Again, thank you for allowing me to participate in the care of your patient.      Sincerely,    Claudio Coombs MD        Copy to patient  JUAREZ PADGETT    [address] [place] [address]

## 2018-06-14 NOTE — MR AVS SNAPSHOT
After Visit Summary   6/14/2018    Fish Hampton    MRN: [id number]           Patient Information     Date Of Birth          1990        Visit Information        Provider Department      6/14/2018 9:30 AM Karma Soriano RN [facility] Solid Organ Transplant        Today's Diagnoses     Transplant donor evaluation    -  1       Follow-ups after your visit        Your next 10 appointments already scheduled     Jun 14, 2018 12:00 PM CDT   (Arrive by 11:45 AM)   Liver Donor Evaluation with Jesu Lagunas MD   [facility] Solid Organ Transplant ([facility])    [address]  [address]  [place] [address]   [phone]            Jun 14, 2018  2:30 PM CDT   NUTRITION VISIT with Hannah Flores RD   [facility] Solid Organ Transplant ([facility])    [address]  [address]  [place] [address]   [phone]            Tyson 15, 2018 12:00 PM CDT   CTA ANGIOGRAM ABDOMEN PELVIS with UUCT1   [place], [place] ([facility], [facility])    [address] [address]   [phone]           Please bring any scans or X-rays taken at other hospitals, if similar tests were done. Also bring a list of your medicines, including vitamins, minerals and over-the-counter drugs. It is safest to leave personal items at home.  Be sure to tell your doctor:   If you have any allergies.   If there s any chance you are pregnant.   If you are breastfeeding.    If you have diabetes as your medication may need to be adjusted for this exam.  You will have contrast for this exam. To prepare:   Do not eat or drink for 2 hours before your exam. If you need to take medicine, you may take it with small sips of water. (We may ask you to take liquid medicine as well.)   The day before your exam, drink extra fluids at least six 8-ounce glasses (unless your doctor tells you to restrict your fluids).   Patients over 70 or patients with diabetes or kidney problems:   If you haven t had a blood test (creatinine test) within the last 30 days, the Cardiologist/Radiologist may require you to get this test prior to your exam.  Please wear loose clothing, such as a sweat suit or jogging clothes. Avoid snaps, zippers and other metal. We may ask you to undress and put on a hospital gown.  If you have any questions, please call the Imaging Department where you will have your exam.            Tyson 15, 2018  1:00 PM CDT   MR ABDOMEN MRCP W/O & W CONTRAST with UUMR1   [facility] [place], MRI ([facility])    [address] [address] [phone]           Take your medicines as usual, unless your doctor tells you not to. Bring a list of your current medicines to your exam (including vitamins, minerals and over-the-counter drugs). Also bring the results of similar scans you may have had.    You may or may not receive IV contrast for this exam pending the discretion of the Radiologist.   Do not eat or drink for 6 hours prior to exam.  The MRI machine uses a strong magnet. Please wear clothes without metal (snaps, zippers). A sweatsuit works well, or we may give you a hospital gown.  Please remove any body piercings and hair extensions before you arrive. You will also remove watches, jewelry, hairpins, wallets, dentures, partial dental plates and hearing aids. You may wear contact lenses, and you may be able to wear your rings. We have a safe place to keep your personal items, but it is safer to leave them at home.  **IMPORTANT** THE INSTRUCTIONS BELOW ARE ONLY FOR THOSE PATIENTS WHO HAVE BEEN PRESCRIBED SEDATION OR GENERAL ANESTHESIA DURING THEIR MRI PROCEDURE:  IF YOUR DOCTOR PRESCRIBED ORAL SEDATION (take medicine to help you relax during your exam):   You must get the medicine from your doctor (oral medication) before you arrive. Bring the medicine to the  exam. Do not take it at home. You ll be told when to take it upon arriving for your exam.   Arrive one hour early. Bring someone who can take you home after the test. Your medicine will make you sleepy. After the exam, you may not drive, take a bus or take a taxi by yourself.  IF YOUR DOCTOR PRESCRIBED IV SEDATION:   Arrive one hour early. Bring someone who can take you home after the test. Your medicine will make you sleepy. After the exam, you may not drive, take a bus or take a taxi by yourself.   No eating 6 hours before your exam. You may have clear liquids up until 4 hours before your exam. (Clear liquids include water, clear tea, black coffee and fruit juice without pulp.)  IF YOUR DOCTOR PRESCRIBED ANESTHESIA (be asleep for your exam):   Arrive 1 1/2 hours early. Bring someone who can take you home after the test. You may not drive, take a bus or take a taxi by yourself.   No eating 8 hours before your exam. You may have clear liquids up until 4 hours before your exam. (Clear liquids include water, clear tea, black coffee and fruit juice without pulp.)   You will spend four to five hours in the recovery room.  If you have any questions, please contact your Imaging Department exam site.              Future tests that were ordered for you today     Open Future Orders        Priority Expected Expires Ordered    Phosphorus Routine 6/14/2018 7/14/2018 6/14/2018            Who to contact     If you have questions or need follow up information about today's clinic visit or your schedule please contact [facility] SOLID ORGAN TRANSPLANT directly at [phone].  Normal or non-critical lab and imaging results will be communicated to you by MyChart, letter or phone within 4 business days after the clinic has received the results. If you do not hear from us within 7 days, please contact the clinic through MyChart or phone. If you have a critical or abnormal lab result, we will notify you by phone as soon as  "possible.  Submit refill requests through [employer] or call your pharmacy and they will forward the refill request to us. Please allow 3 business days for your refill to be completed.          Additional Information About Your Visit        [employer] Information     [employer] lets you send messages to your doctor, view your test results, renew your prescriptions, schedule appointments and more. To sign up, go to [email or web address] . Click on \"Log in\" on the left side of the screen, which will take you to the Welcome page. Then click on \"Sign up Now\" on the right side of the page.     You will be asked to enter the access code listed below, as well as some personal information. Please follow the directions to create your username and password.     Your access code is: [email or web address]  Expires: 2018  6:30 AM     Your access code will  in 90 days. If you need help or a new code, please call your [facility] clinic or [phone].        Care EveryWhere ID     This is your Care EveryWhere ID. This could be used by other organizations to access your [facility] medical records  [id number]         Blood Pressure from Last 3 Encounters:   18 133/82   18 125/80   17 128/80    Weight from Last 3 Encounters:   18 108.9 kg (240 lb)   18 108.9 kg (240 lb)   17 104.3 kg (230 lb)              Today, you had the following     No orders found for display       Primary Care Provider Office Phone # Fax #    Ramirez Pierce [phone]       [address] [place] [id number]        Equal Access to Services     [facility] : Hadii kalyan Quiroz, jaylene hamilton, lily marie . So [facility] [phone].    ATENCIÓN: Si habla español, tiene a catalan disposición servicios gratuitos de asistencia lingüística. Llame al [phone].    We comply with applicable federal civil rights laws and [place] laws. We do not " discriminate on the basis of race, color, national origin, age, disability, sex, sexual orientation, or gender identity.            Thank you!     Thank you for choosing [facility] SOLID ORGAN TRANSPLANT  for your care. Our goal is always to provide you with excellent care. Hearing back from our patients is one way we can continue to improve our services. Please take a few minutes to complete the written survey that you may receive in the mail after your visit with us. Thank you!             Your Updated Medication List - Protect others around you: Learn how to safely use, store and throw away your medicines at www.disposemymeds.org.          This list is accurate as of 6/14/18 11:34 AM.  Always use your most recent med list.                   Brand Name Dispense Instructions for use Diagnosis    ibuprofen 200 MG tablet    ADVIL/MOTRIN     Take 200 mg by mouth every 4 hours as needed for mild pain

## 2018-06-14 NOTE — MR AVS SNAPSHOT
After Visit Summary   6/14/2018    Fish Hampton    MRN: [id number]           Patient Information     Date Of Birth          1990        Visit Information        Provider Department      6/14/2018 2:30 PM Hannah Flores RD M [facility] Solid Organ Transplant        Today's Diagnoses     Transplant donor evaluation    -  1       Follow-ups after your visit        Your next 10 appointments already scheduled     Jun 14, 2018 12:00 PM CDT   (Arrive by 11:45 AM)   Liver Donor Evaluation with MD PREM Fagan [facility] Solid Organ Transplant ([facility])    [address]  [address]  [facility] [address]   [phone]            Jun 14, 2018  2:30 PM CDT   NUTRITION VISIT with SANTHOSH Alexander [facility] Solid Organ Transplant ([facility])    [address]  [address]  [facility] [address]   [phone]            Tyson 15, 2018 12:00 PM CDT   CTA ANGIOGRAM ABDOMEN PELVIS with UUCT1   [place] ([facility], [facility])    [address] [place] [address] [address]   [phone]           Please bring any scans or X-rays taken at other hospitals, if similar tests were done. Also bring a list of your medicines, including vitamins, minerals and over-the-counter drugs. It is safest to leave personal items at home.  Be sure to tell your doctor:   If you have any allergies.   If there s any chance you are pregnant.   If you are breastfeeding.    If you have diabetes as your medication may need to be adjusted for this exam.  You will have contrast for this exam. To prepare:   Do not eat or drink for 2 hours before your exam. If you need to take medicine, you may take it with small sips of water. (We may ask you to take liquid medicine as well.)   The day before your exam, drink extra fluids at least six 8-ounce glasses (unless your doctor tells you to restrict your fluids).   Patients over 70 or patients with diabetes or kidney problems:   If you haven t had a blood test (creatinine test) within the last 30 days, the Cardiologist/Radiologist may require you to get this test prior to your exam.  Please wear loose clothing, such as a sweat suit or jogging clothes. Avoid snaps, zippers and other metal. We may ask you to undress and put on a hospital gown.  If you have any questions, please call the Imaging Department where you will have your exam.            Tyson 15, 2018  1:00 PM CDT   MR ABDOMEN MRCP W/O & W CONTRAST with UUMR1   [facility] [place], MRI ([facility])    [address] [address] [phone]           Take your medicines as usual, unless your doctor tells you not to. Bring a list of your current medicines to your exam (including vitamins, minerals and over-the-counter drugs). Also bring the results of similar scans you may have had.    You may or may not receive IV contrast for this exam pending the discretion of the Radiologist.   Do not eat or drink for 6 hours prior to exam.  The MRI machine uses a strong magnet. Please wear clothes without metal (snaps, zippers). A sweatsuit works well, or we may give you a hospital gown.  Please remove any body piercings and hair extensions before you arrive. You will also remove watches, jewelry, hairpins, wallets, dentures, partial dental plates and hearing aids. You may wear contact lenses, and you may be able to wear your rings. We have a safe place to keep your personal items, but it is safer to leave them at home.  **IMPORTANT** THE INSTRUCTIONS BELOW ARE ONLY FOR THOSE PATIENTS WHO HAVE BEEN PRESCRIBED SEDATION OR GENERAL ANESTHESIA DURING THEIR MRI PROCEDURE:  IF YOUR DOCTOR PRESCRIBED ORAL SEDATION (take medicine to help you relax during your exam):   You must get the medicine from your doctor (oral medication) before you arrive. Bring the medicine to the  exam. Do not take it at home. You ll be told when to take it upon arriving for your exam.   Arrive one hour early. Bring someone who can take you home after the test. Your medicine will make you sleepy. After the exam, you may not drive, take a bus or take a taxi by yourself.  IF YOUR DOCTOR PRESCRIBED IV SEDATION:   Arrive one hour early. Bring someone who can take you home after the test. Your medicine will make you sleepy. After the exam, you may not drive, take a bus or take a taxi by yourself.   No eating 6 hours before your exam. You may have clear liquids up until 4 hours before your exam. (Clear liquids include water, clear tea, black coffee and fruit juice without pulp.)  IF YOUR DOCTOR PRESCRIBED ANESTHESIA (be asleep for your exam):   Arrive 1 1/2 hours early. Bring someone who can take you home after the test. You may not drive, take a bus or take a taxi by yourself.   No eating 8 hours before your exam. You may have clear liquids up until 4 hours before your exam. (Clear liquids include water, clear tea, black coffee and fruit juice without pulp.)   You will spend four to five hours in the recovery room.  If you have any questions, please contact your Imaging Department exam site.              Future tests that were ordered for you today     Open Future Orders        Priority Expected Expires Ordered    Phosphorus Routine 6/14/2018 7/14/2018 6/14/2018            Who to contact     If you have questions or need follow up information about today's clinic visit or your schedule please contact [facility] SOLID ORGAN TRANSPLANT directly at [phone].  Normal or non-critical lab and imaging results will be communicated to you by MyChart, letter or phone within 4 business days after the clinic has received the results. If you do not hear from us within 7 days, please contact the clinic through MyChart or phone. If you have a critical or abnormal lab result, we will notify you by phone as soon as  "possible.  Submit refill requests through [email or web address] or call your pharmacy and they will forward the refill request to us. Please allow 3 business days for your refill to be completed.          Additional Information About Your Visit        [employer] Information     [email or web address] lets you send messages to your doctor, view your test results, renew your prescriptions, schedule appointments and more. To sign up, go to [email or web address] . Click on \"Log in\" on the left side of the screen, which will take you to the Welcome page. Then click on \"Sign up Now\" on the right side of the page.     You will be asked to enter the access code listed below, as well as some personal information. Please follow the directions to create your username and password.     Your access code is: [email or web address]  Expires: 2018  6:30 AM     Your access code will  in 90 days. If you need help or a new code, please call your [facility] clinic or [phone].        Care EveryWhere ID     This is your Care EveryWhere ID. This could be used by other organizations to access your [facility] medical records  [id number]         Blood Pressure from Last 3 Encounters:   18 133/82   17 128/80   16 112/73    Weight from Last 3 Encounters:   18 108.9 kg (240 lb)   17 104.3 kg (230 lb)   16 100.1 kg (220 lb 11.2 oz)              Today, you had the following     No orders found for display       Primary Care Provider Office Phone # Fax #    Ramirez Pierce [phone]       [address] [place] [address]        Equal Access to Services     [facility] : Hadii kalyan Quiroz, jaylene hamilton, qacasandra floweralmalily lazo. So [facility] [phone].    ATENCIÓN: Si habla español, tiene a catalan disposición servicios gratuitos de asistencia lingüística. Llame al [phone].    We comply with applicable federal civil rights laws and [place] laws. " We do not discriminate on the basis of race, color, national origin, age, disability, sex, sexual orientation, or gender identity.            Thank you!     Thank you for choosing [facility]GAN TRANSPL[facility]  for your care. Our goal is always to provide you with excellent care. Hearing back from our patients is one way we can continue to improve our services. Please take a few minutes to complete the written survey that you may receive in the mail after your visit with us. Thank you!             Your Updated Medication List - Protect others around you: Learn how to safely use, store and throw away your medicines at www.disposemymeds.org.          This list is accurate as of 6/14/18 10:25 AM.  Always use your most recent med list.                   Brand Name Dispense Instructions for use Diagnosis    ibuprofen 200 MG tablet    ADVIL/MOTRIN     Take 200 mg by mouth every 4 hours as needed for mild pain

## 2018-06-14 NOTE — PROGRESS NOTES
Outpatient MNT: Liver Donor Evaluation    Current BMI: 29.2 (HT 76 in,  lbs/109 kg)  BMI is within criteria of <30 for liver donation  Recommend BMI remain <30 or <246 lbs      Time Spent: 15 minutes  Visit Type: Initial  Referring Physician: Dr Coombs   Pt accompanied by: self    Nutrition Assessment  Vitamins, Supplements, Pertinent Meds: MVI   Herbal Medicines/Supplements: none     Diet Recall  Breakfast 2 eggs with sausage or occasional cereal    Lunch Meat and cheese s/w with occasional chips    Dinner Pizza or typically grilled meat (chicken, burgers) with some veggies, less starches   Snacks Occasional ice cream    Beverages Water, black coffee, 1 energy drink/day    Alcohol 5 beers/week    Dining out 2x/week      Physical Activity  Pt reports not much exercise lately  He does walk often at work; pt works as an apartment   He previously used to lift weights regularly and does hope to get back to this at some point      Anthropometrics  Height:   76 in   BMI:    29.2    Weight Status:Overweight BMI 25-29.9   Weight:  240 lbs            IBW (lb): 202  % IBW: 119    Wt Hx: Pt reports gaining 15 lbs over the past 1.5 years unintentionally. Pt thinks this weight gain is attributed to going to the gym less and having a baby lately, so overall eating slightly different. Weight stable x 4 months.     Adj/dosing BW: 240 lbs/109 kg       Labs  Recent Labs   Lab Test  06/14/18   0743  05/22/18   0935   CHOL  87  98   HDL  34*  36*   LDL  46  55   TRIG  35  35       FBG = 95  A1C = none on file       Estimated Nutrition Needs  Energy  1696-4880    (20-25 kcal/kg for desired weight loss)        Protein  131-164    (1.2-1.5 g/kg x 3 months post donation)           Fluid  1 ml/kcal or per MD     Nutrition Diagnosis  Food and nutrition related knowledge deficit r/t pre transplant donor eval pt AEB pt verbalized not hearing pre/post transplant diet guidelines.    Nutrition Intervention  Nutrition education  provided:  Reviewed overall healthy diet guidelines for pre and post liver donation. Discussed maintenance of a healthy weight, adequate intake of fiber, vitamin D, variety of fruits vegetables, whole grains and lean proteins. Discussed importance of abstaining from alcohol misuse and avoiding any herbal supplements post donation.  Reviewed foods high in protein and phosphorus needed for 12 weeks post op.     Patient Understanding: Pt verbalized understanding of education provided.  Expected Compliance: Good  Follow-Up Plans: PRN     Nutrition Goals  1. Increased protein and phos needs per above x 3 mos post donation   2. BMI to remain <30 or <246 lbs for donation     Provided pt with contact info.   Jeannette Flores RD, LD  Pgr [phone]

## 2018-06-15 ENCOUNTER — HOSPITAL ENCOUNTER (OUTPATIENT)
Dept: CT IMAGING | Facility: CLINIC | Age: 28
Discharge: HOME | End: 2018-06-15
Attending: TRANSPLANT SURGERY | Admitting: TRANSPLANT SURGERY

## 2018-06-15 ENCOUNTER — HOSPITAL ENCOUNTER (OUTPATIENT)
Dept: MRI IMAGING | Facility: CLINIC | Age: 28
End: 2018-06-15
Attending: TRANSPLANT SURGERY

## 2018-06-15 DIAGNOSIS — Z00.5 TRANSPLANT DONOR EVALUATION: ICD-10-CM

## 2018-06-15 LAB
ANA SER QL IF: NEGATIVE
M TB TUBERC IFN-G BLD QL: NEGATIVE
M TB TUBERC IFN-G/MITOGEN IGNF BLD: 0 IU/ML
SMA IGG SER-ACNC: 17 UNITS (ref 0–19)

## 2018-06-15 PROCEDURE — A9581 GADOXETATE DISODIUM INJ: HCPCS | Performed by: TRANSPLANT SURGERY

## 2018-06-15 PROCEDURE — 25500064 ZZH RX 255 OP 636: Performed by: TRANSPLANT SURGERY

## 2018-06-15 PROCEDURE — 74183 MRI ABD W/O CNTR FLWD CNTR: CPT

## 2018-06-15 PROCEDURE — 40000141 ZZH STATISTIC PERIPHERAL IV START W/O US GUIDANCE

## 2018-06-15 PROCEDURE — 74174 CTA ABD&PLVS W/CONTRAST: CPT

## 2018-06-15 PROCEDURE — 25000128 H RX IP 250 OP 636: Performed by: RADIOLOGY

## 2018-06-15 RX ORDER — IOPAMIDOL 755 MG/ML
100 INJECTION, SOLUTION INTRAVASCULAR ONCE
Status: COMPLETED | OUTPATIENT
Start: 2018-06-15 | End: 2018-06-15

## 2018-06-15 RX ADMIN — GADOXETATE DISODIUM 20 ML: 181.43 INJECTION, SOLUTION INTRAVENOUS at 14:21

## 2018-06-15 RX ADMIN — IOPAMIDOL 100 ML: 755 INJECTION, SOLUTION INTRAVENOUS at 12:29

## 2018-06-18 ENCOUNTER — COMMITTEE REVIEW (OUTPATIENT)
Dept: TRANSPLANT | Facility: CLINIC | Age: 28
End: 2018-06-18

## 2018-06-18 LAB
A1AT PHENOTYP SERPL-IMP: NORMAL
A1AT SERPL-MCNC: 125 MG/DL (ref 90–200)
A1AT SS SERPL-MCNC: NEGATIVE
A1AT SZ SERPL-MCNC: NORMAL
A1AT ZZ SERPL-MCNC: NEGATIVE
COPATH REPORT: NORMAL
SPECIMEN SOURCE: NORMAL

## 2018-06-18 NOTE — COMMITTEE REVIEW
Abdominal Committee Review Note     Evaluation Date:   Committee Review Date: 6/18/2018    Organ being evaluated for: Liver    Transplant Phase:   Transplant Status:     Transplant Coordinator: No matching coordinators  Transplant Surgeon:       Referring Physician: Referred Self    Primary Diagnosis:   Secondary Diagnosis:     Committee Review Members:  Molly Flores RD    - Clinical Carmen Fuentes, [facility]   Transplant Karma Soriano RN, Hannah Guadarrama LPN, Rachael Hernandes RN, Claudio Coombs MD   Transplant Surgery Jesu Lagunas MD       Transplant Eligibility: Age 18 yrs to 50 yrs, Body Mass Index (BMI) < 30 kg/m2    Committee Review Decision: Needs Re-presentation    Relative Contraindications: Other, see committe notes    Absolute Contraindications: None    Committee Chair Claudio Coombs MD verbally attested to the committee's decision.    Committee Discussion Details:      Reviewed Fish's abnormal evaluation findings:        Also of note: Needs clearance by ortho for back and foot left foot fx.         CT Reviewed and decision to use left lobe  for donation.       Next Steps:Set up appt and referral for [facility] spine specialist. Ramirez Dey Ask for note from team stating foot fx is healed. Fish stated he will be re-xrayed in 6 weeks. Get thyroid and Vit D results from pcp. I will send order Communicate all to Fish

## 2018-06-19 ENCOUNTER — TELEPHONE (OUTPATIENT)
Dept: TRANSPLANT | Facility: CLINIC | Age: 28
End: 2018-06-19

## 2018-06-20 ENCOUNTER — TELEPHONE (OUTPATIENT)
Dept: TRANSPLANT | Facility: CLINIC | Age: 28
End: 2018-06-20

## 2018-06-20 NOTE — TELEPHONE ENCOUNTER
I spoke to Fish about the plan that the team made for him..  In 4-6 weeks have his right foot re-xrayed to make sure the stress fx has healed.  Meet with Ramirez Dey in our clinic to assess whether Fish can lye on a surgical bed for 8 hours for surgery.  Get a throid and Vit D level done at local clinic.  I will send orders to [facility] for lab work

## 2018-06-25 DIAGNOSIS — M54.50 LUMBAGO: Primary | ICD-10-CM

## 2018-06-25 NOTE — TELEPHONE ENCOUNTER
FUTURE VISIT INFORMATION      FUTURE VISIT INFORMATION:    Date: 6/26/18    Time: 0900    Location: ORTHO  REFERRAL INFORMATION:    Referring provider:  DR LAWLER    Referring providers clinic:  TRANSPLANT    Reason for visit/diagnosis  LBP      RECORDS STATUS        ALL RECORDS INTERNAL

## 2018-06-26 ENCOUNTER — PRE VISIT (OUTPATIENT)
Dept: ORTHOPEDICS | Facility: CLINIC | Age: 28
End: 2018-06-26

## 2018-07-05 ENCOUNTER — TELEPHONE (OUTPATIENT)
Dept: TRANSPLANT | Facility: CLINIC | Age: 28
End: 2018-07-05

## 2018-07-05 NOTE — TELEPHONE ENCOUNTER
Fish emailed me today and stated that he has decided to withdraw from evaluation.   I said I appreciated him coming for eval   yes

## 2018-08-29 ENCOUNTER — PRE VISIT (OUTPATIENT)
Dept: ORTHOPEDICS | Facility: CLINIC | Age: 28
End: 2018-08-29

## 2018-08-29 ENCOUNTER — OFFICE VISIT (OUTPATIENT)
Dept: ORTHOPEDICS | Facility: CLINIC | Age: 28
End: 2018-08-29
Payer: COMMERCIAL

## 2018-08-29 VITALS — HEIGHT: 76 IN | BODY MASS INDEX: 29.59 KG/M2 | RESPIRATION RATE: 16 BRPM | WEIGHT: 243 LBS

## 2018-08-29 DIAGNOSIS — M84.374A STRESS FRACTURE OF RIGHT FOOT, INITIAL ENCOUNTER: Primary | ICD-10-CM

## 2018-08-29 DIAGNOSIS — M84.375A STRESS FRACTURE OF LEFT FOOT, INITIAL ENCOUNTER: ICD-10-CM

## 2018-08-29 LAB
ANION GAP SERPL CALCULATED.3IONS-SCNC: 9 MMOL/L (ref 3–14)
BUN SERPL-MCNC: 13 MG/DL (ref 7–30)
CALCIUM SERPL-MCNC: 8.9 MG/DL (ref 8.5–10.1)
CHLORIDE SERPL-SCNC: 103 MMOL/L (ref 94–109)
CO2 SERPL-SCNC: 27 MMOL/L (ref 20–32)
CREAT SERPL-MCNC: 0.93 MG/DL (ref 0.66–1.25)
GFR SERPL CREATININE-BSD FRML MDRD: >90 ML/MIN/1.7M2
GLUCOSE SERPL-MCNC: 82 MG/DL (ref 70–99)
POTASSIUM SERPL-SCNC: 3.8 MMOL/L (ref 3.4–5.3)
SODIUM SERPL-SCNC: 140 MMOL/L (ref 133–144)
TSH SERPL DL<=0.005 MIU/L-ACNC: 3.9 MU/L (ref 0.4–4)

## 2018-08-29 NOTE — PATIENT INSTRUCTIONS
L 2nd metatarsal stress reaction and arthritis    Plan  --recommend nwb in cam boot and recheck in 2 weeks  --labs today I can send a letter or call.      Julio Cui MD CAQ

## 2018-08-29 NOTE — LETTER
8/29/2018      RE: Fish Hampton  [address]  [place] [address]        Subjective:   Fish Hampton is a 27 year old male who is here for a second opinion for a right foot stress fx. He reports that pain started in October 2017 when he doubled his mileage during a hiking trip, and the pain was exacerbated during winter when he is playing recreational basketball. He is a  for a living and he is on his feet all day for work. Walking hallways aggravates this the most.   He tried a CAM boot for one day in June. He stopped running and tries to get off his feet.  He has been walking on it since  He does not feel he can miss work    Background:   Date of injury: N/A  Duration of symptoms: 10 months  Mechanism of Injury: Insidious Onset; Activity Related   Intensity:  6/10 at worst  Aggravating factors: After a day of work, at night  Relieving Factors: rest  Prior Evaluation: Prior Physician Evalutation: [facility], MRI    He is lifting. He is biking. triathalon--sprint distance.      Maternal grandmother  Calcium: milk 1-2  Vitamin D. --mvt with vit D.takes D3 in the winter.      PAST MEDICAL, SOCIAL, SURGICAL AND FAMILY HISTORY: He  has a past medical history of Reduced vision and Scoliosis. He also has no past medical history of Anemia; Aortic valve disorders; Arrhythmia; Arthritis; Asymptomatic human immunodeficiency virus (HIV) infection status (H); Back injury; Bleeding disorder (H); Blood disease; Bone tumor; Cancer (H); Cardiac abnormality; Cerebral infarction (H); Chest pain; Chronic kidney disease; Chronic osteoarthritis; Congestive heart failure, unspecified; Coronary artery disease; Deep vein thrombosis (DVT) (H); Degenerative joint disease; Depressive disorder; Diabetes (H); Hearing problem; Heart disease; Hepatitis; Hyperlipidaemia; Hypertension; Hypothyroidism; Immune disorder (H); Learning disability; Liver disease; Lung disease; Mental disorder; Neck injuries; RA (rheumatoid arthritis) (H);  "Seizures (H); Shortness of breath; Stomach ulcer; Surgical complication; Thyroid disease; Tuberculosis; Ulcer, gastric, acute; or Uncomplicated asthma.  He  has no past surgical history on file.  His family history includes Diabetes in his maternal grandfather.  He reports that he has never smoked. He has never used smokeless tobacco. He reports that he drinks alcohol. He reports that he does not use illicit drugs.    ALLERGIES: He has No Known Allergies.    CURRENT MEDICATIONS: He has a current medication list which includes the following prescription(s): ibuprofen.     REVIEW OF SYSTEMS: 3 point review of systems is negative except as noted above.    Tobacco  Alcohol 5-7 per week.     Exam:   Resp 16  Ht 6' 3.98\" (1.93 m)  Wt 243 lb (110.2 kg)  BMI 29.59 kg/m2     CONSTITUTIONAL: healthy, alert and no distress  HEAD: Normocephalic. No masses, lesions, tenderness or abnormalities  SKIN: no suspicious lesions or rashes  GAIT: normal  NEUROLOGIC: Non-focal  PSYCHIATRIC: affect normal/bright and mentation appears normal.    MUSCULOSKELETAL:   R foot small bump at the base of the 2nd metatarsal. Tender here and distal along the metatarsal shaft.    Nl sensation and cap refill.    MRI June 2018 with degenerative changes at the base of the 2nd metatarsal with edema at the base of the 2nd metatarsal and no fx line     Assessment/Plan:   L 2nd metatarsal stress reaction  L midfoot arthritis    Plan  --reviewed relative rest and protection to be pain free. We discussed the resumption of cam boot, scooter use if painful  --w/u for secondary w/u  --recheck in 2 weeks for advancement of activities.    Julio Cui MD CAQ    "

## 2018-08-29 NOTE — PROGRESS NOTES
Subjective:   Fish Hampton is a 27 year old male who is here for a second opinion for a right foot stress fx. He reports that pain started in October 2017 when he doubled his mileage during a hiking trip, and the pain was exacerbated during winter when he is playing recreational basketball. He is a  for a living and he is on his feet all day for work. Walking hallways aggravates this the most.   He tried a CAM boot for one day in June. He stopped running and tries to get off his feet.  He has been walking on it since  He does not feel he can miss work    Background:   Date of injury: N/A  Duration of symptoms: 10 months  Mechanism of Injury: Insidious Onset; Activity Related   Intensity:  6/10 at worst  Aggravating factors: After a day of work, at night  Relieving Factors: rest  Prior Evaluation: Prior Physician Evalutation: [facility], MRI    He is lifting. He is biking. triathalon--sprint distance.      Maternal grandmother  Calcium: milk 1-2  Vitamin D. --mvt with vit D.takes D3 in the winter.      PAST MEDICAL, SOCIAL, SURGICAL AND FAMILY HISTORY: He  has a past medical history of Reduced vision and Scoliosis. He also has no past medical history of Anemia; Aortic valve disorders; Arrhythmia; Arthritis; Asymptomatic human immunodeficiency virus (HIV) infection status (H); Back injury; Bleeding disorder (H); Blood disease; Bone tumor; Cancer (H); Cardiac abnormality; Cerebral infarction (H); Chest pain; Chronic kidney disease; Chronic osteoarthritis; Congestive heart failure, unspecified; Coronary artery disease; Deep vein thrombosis (DVT) (H); Degenerative joint disease; Depressive disorder; Diabetes (H); Hearing problem; Heart disease; Hepatitis; Hyperlipidaemia; Hypertension; Hypothyroidism; Immune disorder (H); Learning disability; Liver disease; Lung disease; Mental disorder; Neck injuries; RA (rheumatoid arthritis) (H); Seizures (H); Shortness of breath; Stomach ulcer; Surgical complication; Thyroid  "disease; Tuberculosis; Ulcer, gastric, acute; or Uncomplicated asthma.  He  has no past surgical history on file.  His family history includes Diabetes in his maternal grandfather.  He reports that he has never smoked. He has never used smokeless tobacco. He reports that he drinks alcohol. He reports that he does not use illicit drugs.    ALLERGIES: He has No Known Allergies.    CURRENT MEDICATIONS: He has a current medication list which includes the following prescription(s): ibuprofen.     REVIEW OF SYSTEMS: 3 point review of systems is negative except as noted above.    Tobacco  Alcohol 5-7 per week.     Exam:   Resp 16  Ht 6' 3.98\" (1.93 m)  Wt 243 lb (110.2 kg)  BMI 29.59 kg/m2     CONSTITUTIONAL: healthy, alert and no distress  HEAD: Normocephalic. No masses, lesions, tenderness or abnormalities  SKIN: no suspicious lesions or rashes  GAIT: normal  NEUROLOGIC: Non-focal  PSYCHIATRIC: affect normal/bright and mentation appears normal.    MUSCULOSKELETAL:   R foot small bump at the base of the 2nd metatarsal. Tender here and distal along the metatarsal shaft.    Nl sensation and cap refill.    MRI June 2018 with degenerative changes at the base of the 2nd metatarsal with edema at the base of the 2nd metatarsal and no fx line     Assessment/Plan:   R 2nd metatarsal stress reaction  R midfoot arthritis    Plan  --reviewed relative rest and protection to be pain free. We discussed the resumption of cam boot, scooter use if painful  --w/u for secondary w/u  --recheck in 2 weeks for advancement of activities.    Julio Cui MD CAQ    "

## 2018-08-29 NOTE — LETTER
REPORT OF WORK ABILITY    NOTE TO EMPLOYEE: You must promptly provide a copy of this report to your  employer or worker's compensation insurer, and Qualified Rehabilitation Consultant.    Date: 2018                     Employee Name: Fish Hapmton         YOB: 1990  Medical Record Number: [id number]   Soc.Sec.No: [id number]  Employer:                  Date of Injury:   Managed Care Organization / Insurance Company Name: UNKNOWN    Diagnosis: R foot midfoot stress injury.    Work Related: unknown aggravated    Permanent Partial Disability(PPD) likely: NO, UNKNOWN    EMPLOYEE IS ABLE TO WORK: return to work       RESTRICTIONS IF ANY:    OTHER RESTRICTIONS:   Nonweightbearing Right foot restrictions  2 weeks from visit 18.  Please allow use of cam boot and scooter.    TREATMENT PLAN/NOTES:    Julio Cui MD

## 2018-08-29 NOTE — TELEPHONE ENCOUNTER
FUTURE VISIT INFORMATION      FUTURE VISIT INFORMATION:    Date: 8/29    Time: 11:30    Location:   REFERRAL INFORMATION:    Referring provider:  SELF    Referring providers clinic:      Reason for visit/diagnosis: Stress fracture in right foot, second opinion    RECORDS REQUESTED FROM:       Clinic name Comments Records Status Imaging Status   [facility]  RECORDS IN CARE EVERYWHRE  IN PAC                                   RECORDS STATUS

## 2018-08-29 NOTE — MR AVS SNAPSHOT
After Visit Summary   2018    Fish Hampton    MRN: [id number]           Patient Information     Date Of Birth          1990        Visit Information        Provider Department      2018 11:30 AM Julio Cui MD [facility] Sports Medicine        Today's Diagnoses     Stress fracture of left foot, initial encounter    -  1      Care Instructions    L 2nd metatarsal stress reaction and arthritis    Plan  --recommend nwb in cam boot and recheck in 2 weeks  --labs today I can send a letter or call.      Julio Cui MD CAQ            Follow-ups after your visit        Future tests that were ordered for you today     Open Future Orders        Priority Expected Expires Ordered    TSH Routine 2018 10/28/2018 2018    Basic metabolic panel Routine 2018    Vitamin D Deficiency Routine 2018            Who to contact     Please call your clinic at [phone] to:    Ask questions about your health    Make or cancel appointments    Discuss your medicines    Learn about your test results    Speak to your doctor            Additional Information About Your Visit        Connolly Information     Connolly is an electronic gateway that provides easy, online access to your medical records. With Connolly, you can request a clinic appointment, read your test results, renew a prescription or communicate with your care team.     To sign up for Connolly visit the website at [email or web address]   You will be asked to enter the access code listed below, as well as some personal information. Please follow the directions to create your username and password.     Your access code is: [email or web address]  Expires: 2018  6:31 AM     Your access code will  in 90 days. If you need help or a new code, please contact your [facility] or call [phone] for assistance.        Care EveryWhere ID     This is  "your Care EveryWhere ID. This could be used by other organizations to access your [place] medical records  [id number]        Your Vitals Were     Respirations Height BMI (Body Mass Index)             16 6' 3.98\" (1.93 m) 29.59 kg/m2          Blood Pressure from Last 3 Encounters:   06/14/18 125/80   06/14/18 133/82   06/14/18 125/80    Weight from Last 3 Encounters:   08/29/18 243 lb (110.2 kg)   06/14/18 240 lb (108.9 kg)   06/14/18 240 lb (108.9 kg)               Primary Care Provider Office Phone # Fax #    Ramirez Ryan Pierce [phone]       [address] [place] [address]        Equal Access to Services     SADE BHAKTA : Cornell schwartz Sobelinda, waaxda luqadaha, qaybta kaalmada adeegyada, lily prabhakar . So [facility] [phone].    ATENCIÓN: Si habla español, tiene a catalan disposición servicios gratuitos de asistencia lingüística. Angel al [phone].    We comply with applicable federal civil rights laws and [place] laws. We do not discriminate on the basis of race, color, national origin, age, disability, sex, sexual orientation, or gender identity.            Thank you!     Thank you for choosing [facility]  for your care. Our goal is always to provide you with excellent care. Hearing back from our patients is one way we can continue to improve our services. Please take a few minutes to complete the written survey that you may receive in the mail after your visit with us. Thank you!             Your Updated Medication List - Protect others around you: Learn how to safely use, store and throw away your medicines at www.disposemymeds.org.          This list is accurate as of 8/29/18 12:14 PM.  Always use your most recent med list.                   Brand Name Dispense Instructions for use Diagnosis    ibuprofen 200 MG tablet    ADVIL/MOTRIN     Take 200 mg by mouth every 4 hours as needed for mild pain          "

## 2018-08-30 LAB — DEPRECATED CALCIDIOL+CALCIFEROL SERPL-MC: 34 UG/L (ref 20–75)

## 2018-09-12 ENCOUNTER — OFFICE VISIT (OUTPATIENT)
Dept: ORTHOPEDICS | Facility: CLINIC | Age: 28
End: 2018-09-12
Payer: COMMERCIAL

## 2018-09-12 VITALS — WEIGHT: 243 LBS | HEIGHT: 76 IN | BODY MASS INDEX: 29.59 KG/M2 | RESPIRATION RATE: 16 BRPM

## 2018-09-12 DIAGNOSIS — M84.374D STRESS FRACTURE OF RIGHT FOOT WITH ROUTINE HEALING, SUBSEQUENT ENCOUNTER: Primary | ICD-10-CM

## 2018-09-12 DIAGNOSIS — M19.071 OSTEOARTHRITIS OF MIDFOOT, RIGHT: ICD-10-CM

## 2018-09-12 NOTE — LETTER
REPORT OF WORK ABILITY    NOTE TO EMPLOYEE: You must promptly provide a copy of this report to your  employer or worker's compensation insurer, and Qualified Rehabilitation Consultant.    Date: 9/12/2018                     Employee Name: Fish Hampton         YOB: 1990  Medical Record Number: [id number]   Soc.Sec.No: [id number]  Employer:                   Date of Injury: 8/29/18  Managed Care Organization / Insurance Company Name: UNKNOWN    Diagnosis: R midfoot stress injury    Work Related: no   Permanent Partial Disability(PPD) likely: UNKNOWN    EMPLOYEE IS ABLE TO WORK:   Healing fracture  Transition to cast boot this week, then transition out as pain allows.   will use orthotics.  No ladders with the cast boot.  Stand on the ground only.    RESTRICTIONS IF ANY:      OTHER RESTRICTIONS: None    TREATMENT PLAN/NOTES:     Julio Cui MD

## 2018-09-12 NOTE — PATIENT INSTRUCTIONS
R midfoot stress injury  -- healing  -- transition to walking in cam boot for the next week  -- then wean gradually as pain allows.   -- use orthotics in shoe, start with superfeet, if not helping, then  -- Vit D 2000 international daily  -- Can call for a orthotic referral for custom orthotic if needed    Julio Cui MD CAQ

## 2018-09-12 NOTE — LETTER
9/12/2018      RE: Fish Hampton  [address]  [place] [address]        Subjective:   Fish Hampton is a 27 year old male who is here following up on R midfoot stress injury and OA since Oct 2017 that was no improving with several modifications.  His lab results returned without evidence of secondary cause. Protection with cam boot and NWB at work have decreased his midfoot pain and swelling significantly.  No numbness or tingling.    Date of injury:   Date last seen: 8/29/2018  Following Therapeutic Plan: Yes boot and scooter  Pain: Improving  Function: NA  Interval History:      PAST MEDICAL, SOCIAL, SURGICAL AND FAMILY HISTORY: He  has a past medical history of Reduced vision and Scoliosis. He also has no past medical history of Anemia; Aortic valve disorders; Arrhythmia; Arthritis; Asymptomatic human immunodeficiency virus (HIV) infection status (H); Back injury; Bleeding disorder (H); Blood disease; Bone tumor; Cancer (H); Cardiac abnormality; Cerebral infarction (H); Chest pain; Chronic kidney disease; Chronic osteoarthritis; Congestive heart failure, unspecified; Coronary artery disease; Deep vein thrombosis (DVT) (H); Degenerative joint disease; Depressive disorder; Diabetes (H); Hearing problem; Heart disease; Hepatitis; Hyperlipidaemia; Hypertension; Hypothyroidism; Immune disorder (H); Learning disability; Liver disease; Lung disease; Mental disorder; Neck injuries; RA (rheumatoid arthritis) (H); Seizures (H); Shortness of breath; Stomach ulcer; Surgical complication; Thyroid disease; Tuberculosis; Ulcer, gastric, acute; or Uncomplicated asthma.  He  has no past surgical history on file.  His family history includes Diabetes in his maternal grandfather.  He reports that he has never smoked. He has never used smokeless tobacco. He reports that he drinks alcohol. He reports that he does not use illicit drugs.    ALLERGIES: He has No Known Allergies.    CURRENT MEDICATIONS: He has a current  "medication list which includes the following prescription(s): ibuprofen.     REVIEW OF SYSTEMS: 3 point review of systems is negative except as noted above.     Exam:   Resp 16  Ht 6' 3.98\" (1.93 m)  Wt 243 lb (110.2 kg)  BMI 29.59 kg/m2       MUSCULOSKELETAL:   R midfoot  No swelling nontender  AROM full  Nl sensation and 2+ DP       Assessment/Plan:   R midfoot stress injury  --clinically improved  -- s/c scooter and transition to cam boot and then wean as pain allows over the  Next 1-2 weeks and then transition to workboot with semirigid orthotic. We discussed he has superfeet but had only used these in running shoes. He will transition these to work shoes. If not helpful, he will call for a referral for a custom orthotic  --continue vit D supp at 2000 international units daily.  --we will restrict him from ladders and heights while in the cam boot. Once weaned he can return to these activites, this is estimated to be 2 weeks    Julio Cui MD CAQ      "

## 2018-09-12 NOTE — PROGRESS NOTES
Subjective:   Fish Hampton is a 27 year old male who is here following up on R midfoot stress injury and OA since Oct 2017 that was no improving with several modifications.  His lab results returned without evidence of secondary cause. Protection with cam boot and NWB at work have decreased his midfoot pain and swelling significantly.  No numbness or tingling.    Date of injury:   Date last seen: 8/29/2018  Following Therapeutic Plan: Yes boot and scooter  Pain: Improving  Function: NA  Interval History:      PAST MEDICAL, SOCIAL, SURGICAL AND FAMILY HISTORY: He  has a past medical history of Reduced vision and Scoliosis. He also has no past medical history of Anemia; Aortic valve disorders; Arrhythmia; Arthritis; Asymptomatic human immunodeficiency virus (HIV) infection status (H); Back injury; Bleeding disorder (H); Blood disease; Bone tumor; Cancer (H); Cardiac abnormality; Cerebral infarction (H); Chest pain; Chronic kidney disease; Chronic osteoarthritis; Congestive heart failure, unspecified; Coronary artery disease; Deep vein thrombosis (DVT) (H); Degenerative joint disease; Depressive disorder; Diabetes (H); Hearing problem; Heart disease; Hepatitis; Hyperlipidaemia; Hypertension; Hypothyroidism; Immune disorder (H); Learning disability; Liver disease; Lung disease; Mental disorder; Neck injuries; RA (rheumatoid arthritis) (H); Seizures (H); Shortness of breath; Stomach ulcer; Surgical complication; Thyroid disease; Tuberculosis; Ulcer, gastric, acute; or Uncomplicated asthma.  He  has no past surgical history on file.  His family history includes Diabetes in his maternal grandfather.  He reports that he has never smoked. He has never used smokeless tobacco. He reports that he drinks alcohol. He reports that he does not use illicit drugs.    ALLERGIES: He has No Known Allergies.    CURRENT MEDICATIONS: He has a current medication list which includes the following prescription(s): ibuprofen.     REVIEW OF  "SYSTEMS: 3 point review of systems is negative except as noted above.     Exam:   Resp 16  Ht 6' 3.98\" (1.93 m)  Wt 243 lb (110.2 kg)  BMI 29.59 kg/m2       MUSCULOSKELETAL:   R midfoot  No swelling nontender  AROM full  Nl sensation and 2+ DP       Assessment/Plan:   R midfoot stress injury  --clinically improved  -- s/c scooter and transition to cam boot and then wean as pain allows over the  Next 1-2 weeks and then transition to workboot with semirigid orthotic. We discussed he has superfeet but had only used these in running shoes. He will transition these to work shoes. If not helpful, he will call for a referral for a custom orthotic  --continue vit D supp at 2000 international units daily.  --we will restrict him from ladders and heights while in the cam boot. Once weaned he can return to these activites, this is estimated to be 2 weeks    Julio Cui MD CAQ    "

## 2018-09-12 NOTE — MR AVS SNAPSHOT
"              After Visit Summary   9/12/2018    Fish Hampton    MRN: [id number]           Patient Information     Date Of Birth          1990        Visit Information        Provider Department      9/12/2018 11:30 AM Julio Cui MD [facility] Sports Medicine        Care Instructions    R midfoot stress injury  -- healing  -- transition to walking in cam boot for the next week  -- then wean gradually as pain allows.   -- use orthotics in shoe, start with superfeet, if not helping, then  -- Vit D 2000 international daily  -- Can call for a orthotic referral for custom orthotic if needed    Julio Cui MD CAQ            Follow-ups after your visit        Who to contact     Please call your clinic at [phone] to:    Ask questions about your health    Make or cancel appointments    Discuss your medicines    Learn about your test results    Speak to your doctor            Additional Information About Your Visit        Geneluxhart Information     DepoMed gives you secure access to your electronic health record. If you see a primary care provider, you can also send messages to your care team and make appointments. If you have questions, please call your primary care clinic.  If you do not have a primary care provider, please call [phone] and they will assist you.      DepoMed is an electronic gateway that provides easy, online access to your medical records. With DepoMed, you can request a clinic appointment, read your test results, renew a prescription or communicate with your care team.     To access your existing account, please contact your [facility] or call [phone] for assistance.        Care EveryWhere ID     This is your Care EveryWhere ID. This could be used by other organizations to access your [place] medical records  [id number]        Your Vitals Were     Respirations Height BMI (Body Mass Index)             16 6' 3.98\" (1.93 m) 29.59 kg/m2    "       Blood Pressure from Last 3 Encounters:   06/14/18 125/80   06/14/18 133/82   06/14/18 125/80    Weight from Last 3 Encounters:   09/12/18 243 lb (110.2 kg)   08/29/18 243 lb (110.2 kg)   06/14/18 240 lb (108.9 kg)              Today, you had the following     No orders found for display       Primary Care Provider Office Phone # Fax #    Ramirez Pierce [phone]       [address] [place] [address]        Equal Access to Services     [facility]: Hadii kalyan nielson hadasho Sobelinda, waaxda luqadaha, qaybta kaalmada neda, lily prabhakar . So [facility] [phone].    ATENCIÓN: Si habla español, tiene a catalan disposición servicios gratuitos de asistencia lingüística. [facility] [phone].    We comply with applicable federal civil rights laws and [place] laws. We do not discriminate on the basis of race, color, national origin, age, disability, sex, sexual orientation, or gender identity.            Thank you!     Thank you for choosing [facility]  for your care. Our goal is always to provide you with excellent care. Hearing back from our patients is one way we can continue to improve our services. Please take a few minutes to complete the written survey that you may receive in the mail after your visit with us. Thank you!             Your Updated Medication List - Protect others around you: Learn how to safely use, store and throw away your medicines at www.disposemymeds.org.          This list is accurate as of 9/12/18 11:33 AM.  Always use your most recent med list.                   Brand Name Dispense Instructions for use Diagnosis    ibuprofen 200 MG tablet    ADVIL/MOTRIN     Take 200 mg by mouth every 4 hours as needed for mild pain

## 2018-10-10 ENCOUNTER — TELEPHONE (OUTPATIENT)
Dept: ORTHOPEDICS | Facility: CLINIC | Age: 28
End: 2018-10-10

## 2018-10-10 NOTE — TELEPHONE ENCOUNTER
[facility] Call Center    Phone Message    May a detailed message be left on voicemail: yes    Reason for Call: Form or Letter   Type or form/letter needing completion: Need letter to go back to work   Provider: Dr. Cui     Date form needed: ASAP   Once completed: Fax form to: [phone]      Action Taken: Message routed to:  [facility] ([facility] Sports

## 2018-10-11 NOTE — TELEPHONE ENCOUNTER
Discussed with macey boot is weaned and he walked 1.5 miles without pain. Letter written for return to work without restriction. Please fax to requested location.    Julio Cui MD CAQ

## 2019-06-22 NOTE — TELEPHONE ENCOUNTER
Is abo O. Hx lumbar herniated disc but is better and no problems lying on back. Will ask SW to screen. Will now send donor LIVER pkt.   S/P knee surgery

## 2020-03-10 ENCOUNTER — HEALTH MAINTENANCE LETTER (OUTPATIENT)
Age: 30
End: 2020-03-10

## 2020-12-20 ENCOUNTER — HEALTH MAINTENANCE LETTER (OUTPATIENT)
Age: 30
End: 2020-12-20

## 2021-04-24 ENCOUNTER — HEALTH MAINTENANCE LETTER (OUTPATIENT)
Age: 31
End: 2021-04-24

## 2021-10-03 ENCOUNTER — HEALTH MAINTENANCE LETTER (OUTPATIENT)
Age: 31
End: 2021-10-03

## 2022-05-15 ENCOUNTER — HEALTH MAINTENANCE LETTER (OUTPATIENT)
Age: 32
End: 2022-05-15

## 2022-09-11 ENCOUNTER — HEALTH MAINTENANCE LETTER (OUTPATIENT)
Age: 32
End: 2022-09-11

## 2023-06-03 ENCOUNTER — HEALTH MAINTENANCE LETTER (OUTPATIENT)
Age: 33
End: 2023-06-03